# Patient Record
Sex: MALE | Race: WHITE
[De-identification: names, ages, dates, MRNs, and addresses within clinical notes are randomized per-mention and may not be internally consistent; named-entity substitution may affect disease eponyms.]

---

## 2017-06-04 ENCOUNTER — HOSPITAL ENCOUNTER (EMERGENCY)
Dept: HOSPITAL 62 - ER | Age: 42
Discharge: HOME | End: 2017-06-04
Payer: COMMERCIAL

## 2017-06-04 VITALS — SYSTOLIC BLOOD PRESSURE: 122 MMHG | DIASTOLIC BLOOD PRESSURE: 83 MMHG

## 2017-06-04 DIAGNOSIS — R51: ICD-10-CM

## 2017-06-04 DIAGNOSIS — I25.2: ICD-10-CM

## 2017-06-04 DIAGNOSIS — H57.12: ICD-10-CM

## 2017-06-04 DIAGNOSIS — H00.026: Primary | ICD-10-CM

## 2017-06-04 DIAGNOSIS — M54.2: ICD-10-CM

## 2017-06-04 DIAGNOSIS — I10: ICD-10-CM

## 2017-06-04 DIAGNOSIS — M25.512: ICD-10-CM

## 2017-06-04 PROCEDURE — 70450 CT HEAD/BRAIN W/O DYE: CPT

## 2017-06-04 PROCEDURE — 72040 X-RAY EXAM NECK SPINE 2-3 VW: CPT

## 2017-06-04 PROCEDURE — 99284 EMERGENCY DEPT VISIT MOD MDM: CPT

## 2017-06-04 PROCEDURE — 93005 ELECTROCARDIOGRAM TRACING: CPT

## 2017-06-04 PROCEDURE — 93010 ELECTROCARDIOGRAM REPORT: CPT

## 2017-06-04 NOTE — ER DOCUMENT REPORT
ED General





- General


Chief Complaint: Headache


Stated Complaint: LEFT NECK AND SHOULDER PAIN


Time Seen by Provider: 06/04/17 09:25


TRAVEL OUTSIDE OF THE [place] IN LAST 30 DAYS: No





- HPI


Patient complains to provider of: Head pain left eye pain left shoulder pain


Notes: 


Coming in for the above complaints today of starting this morning.  Patient 

states headache and frontal states has a history of lupus states never has had 

a headache like this before.  Localized in the frontal region right above the 

frontal sinus area.  Patient also complains of left eye pain patient does wear 

contacts that she does sleep and contacts.  Patient also complains of left neck 

and shoulder pain.  States sharp tingling intermittent.  Patient does state he 

has had a history of a heart attack in the past with removal of clots noted 

stent placement.  Patient otherwise denies any past medical history.  Denies 

fevers chills nausea vomiting diarrhea.





- Related Data


Allergies/Adverse Reactions: 


 





No Known Allergies Allergy (Verified 06/04/17 07:28)


 











Past Medical History





- Social History


Smoking Status: Current Every Day Smoker


Chew tobacco use (# tins/day): No


Frequency of alcohol use: None


Drug Abuse: None


Family History: Reviewed & Not Pertinent, Other - Father--MI at 60.


Patient has suicidal ideation: No


Patient has homicidal ideation: No





- Past Medical History


Cardiac Medical History: Reports: Hx Heart Attack, Hx Hypertension


Renal/ Medical History: Denies: Hx Peritoneal Dialysis


Past Surgical History: Reports: Hx Cardiac Catheterization





- Immunizations


Hx Diphtheria, Pertussis, Tetanus Vaccination: Yes





Review of Systems





- Review of Systems


Constitutional: Other - Pain eye pain shoulder pain


EENT: No symptoms reported


Cardiovascular: No symptoms reported


Respiratory: No symptoms reported


Gastrointestinal: No symptoms reported


Genitourinary: No symptoms reported


Male Genitourinary: No symptoms reported


Musculoskeletal: No symptoms reported


Skin: No symptoms reported


Hematologic/Lymphatic: No symptoms reported


Neurological/Psychological: No symptoms reported





Physical Exam





- Vital signs


Vitals: 


 











Temp Pulse Resp BP Pulse Ox


 


 97.6 F   82   16   137/80 H  97 


 


 06/04/17 07:28  06/04/17 07:28  06/04/17 07:28  06/04/17 07:28  06/04/17 07:28











Interpretation: Normal





- General


General appearance: Appears well, Alert





- HEENT


Head: Normocephalic, Atraumatic


Eyes: Normal


Pupils: PERRL


Neck: Other - Fluids reproducible paraspinal neck pain


Notes: 


Evaluation of the patient's eye after tetracaine instillation and floor stating 

shows that patient has been forcing uptake.  There is no signs of corneal 

abrasion or ulcer formation.  Patient does have a stye formation of the bottom 

and upper lip.  There is mild swelling of the upper and bottom lip.  No signs 

of orbital cellulitis.  Patient does have ocular motions intact.





- Respiratory


Respiratory status: No respiratory distress


Chest status: Nontender


Breath sounds: Normal


Chest palpation: Normal





- Cardiovascular


Rhythm: Regular


Heart sounds: Normal auscultation


Murmur: No





- Abdominal


Inspection: Normal


Distension: No distension


Bowel sounds: Normal


Tenderness: Nontender


Organomegaly: No organomegaly





- Back


Back: Normal, Nontender





- Extremities


General upper extremity: Normal inspection, Nontender, Normal color, Normal ROM

, Normal temperature


General lower extremity: Normal inspection, Nontender, Normal color, Normal ROM

, Normal temperature, Normal weight bearing.  No: Ankush's sign





- Neurological


Neuro grossly intact: Yes


Cognition: Normal


Orientation: AAOx4


Tremaine Coma Scale Eye Opening: Spontaneous


Tremaine Coma Scale Verbal: Oriented


Ratcliff Coma Scale Motor: Obeys Commands


[place]iff Coma Scale Total: 15


Speech: Normal


Motor strength normal: LUE, RUE, LLE, RLE


Sensory: Normal





- Psychological


Associated symptoms: Normal affect, Normal mood





- Skin


Skin Temperature: Warm


Skin Moisture: Dry


Skin Color: Normal





Course





- Re-evaluation


Re-evalutation: 


06/04/17 15:22


EKG x-ray of the neck and head CT were performed showing diffuse arthritic 

changes in the neck had been EKG negative.  Eye examination is as stated.  

Patient will begin Polytrim drops for the stye formation.  Patient was educated 

about treatment of the stye.  Patient was given for Fioricet for his headache.





06/04/17 15:24








- Vital Signs


Vital signs: 


 











Temp Pulse Resp BP Pulse Ox


 


 97.5 F   63   16   122/83   97 


 


 06/04/17 12:18  06/04/17 12:18  06/04/17 12:18  06/04/17 12:18  06/04/17 12:18














Procedures





- Eye Procedure


  ** Left


Notes: 





06/04/17 15:24


Evaluation of the patient's eye after tetracaine instillation and floor stating 

shows that patient has been forcing uptake.  There is no signs of corneal 

abrasion or ulcer formation.  Patient does have a stye formation of the bottom 

and upper lip.  There is mild swelling of the upper and bottom lip.  No signs 

of orbital cellulitis.  Patient does have ocular motions intact.





Discharge





- Discharge


Clinical Impression: 


Hordeolum of left eye


Qualifiers:


 Hordeolum type: internum Eyelid: unspecified eyelid Qualified Code(s): H00.026 

- Hordeolum internum left eye, unspecified eyelid





Headache


Qualifiers:


 Headache type: unspecified Headache chronicity pattern: unspecified pattern 

Intractability: not intractable Qualified Code(s): R51 - Headache





Condition: Good


Disposition: HOME, SELF-CARE


Instructions:  Headache ([facility]), Sty ([facility]), Oral Narcotic Medication ([facility])


Additional Instructions: 


The eyedrops given to you here in the ER 2 drops 4 times a day for 7 days.





Medication as prescribed.  He may also take Tylenol and Motrin if not taking 

the headache medicine.


Prescriptions: 


Butalb/Acetaminophen/Caffeine [Fioricet -40 mg Capsule] 1 cap PO Q4 PRN #

20 cap


 PRN Reason: 


Forms:  Return to Work


Referrals: 


SANDEEP MCDUFFIE MD [Primary Care Provider] - Follow up as needed

## 2017-06-04 NOTE — EKG REPORT
SEVERITY:- ABNORMAL ECG -

SINUS RHYTHM

NONSPECIFIC INTRAVENTRICULAR CONDUCTION DELAY

:

Confirmed by: Felicia Kebede 04-Jun-2017 13:26:38

## 2017-06-04 NOTE — RADIOLOGY REPORT (SQ)
EXAM DESCRIPTION:  CERV SP 3 VIEW OR LESS



COMPLETED DATE/TIME:  6/4/2017 9:51 am



REASON FOR STUDY:  headache neck pain



COMPARISON:  None.



NUMBER OF VIEWS:  Three views.



TECHNIQUE:  AP, lateral and odontoid radiographic images acquired of the cervical spine.



LIMITATIONS:  None.



FINDINGS:  MINERALIZATION: Normal.

ALIGNMENT: Anatomic.

VERTEBRAE: Vertebral bodies of normal height.

DISCS: Mild disc space loss of height with anterior osteophyte formation at C6-7

HARDWARE: None in the spine.

SOFT TISSUES: No masses or calcifications. Lung apices clear.

OTHER: No other significant finding.



IMPRESSION:  Mild degenerative disc changes at C6-7



TECHNICAL DOCUMENTATION:  JOB ID:  [id number]

 2011 [employer]- All Rights Reserved

## 2017-12-11 ENCOUNTER — HOSPITAL ENCOUNTER (EMERGENCY)
Dept: HOSPITAL 62 - ER | Age: 42
Discharge: HOME | End: 2017-12-11
Payer: SELF-PAY

## 2017-12-11 VITALS — DIASTOLIC BLOOD PRESSURE: 82 MMHG | SYSTOLIC BLOOD PRESSURE: 121 MMHG

## 2017-12-11 DIAGNOSIS — I10: ICD-10-CM

## 2017-12-11 DIAGNOSIS — R11.0: ICD-10-CM

## 2017-12-11 DIAGNOSIS — R51: Primary | ICD-10-CM

## 2017-12-11 DIAGNOSIS — F17.200: ICD-10-CM

## 2017-12-11 DIAGNOSIS — I25.2: ICD-10-CM

## 2017-12-11 LAB
ANION GAP SERPL CALC-SCNC: 14 MMOL/L (ref 5–19)
BASOPHILS # BLD AUTO: 0.1 10^3/UL (ref 0–0.2)
BASOPHILS NFR BLD AUTO: 0.5 % (ref 0–2)
BUN SERPL-MCNC: 14 MG/DL (ref 7–20)
CALCIUM: 9.5 MG/DL (ref 8.4–10.2)
CHLORIDE SERPL-SCNC: 102 MMOL/L (ref 98–107)
CO2 SERPL-SCNC: 25 MMOL/L (ref 22–30)
CREAT SERPL-MCNC: 0.84 MG/DL (ref 0.52–1.25)
EOSINOPHIL # BLD AUTO: 0.1 10^3/UL (ref 0–0.6)
EOSINOPHIL NFR BLD AUTO: 0.9 % (ref 0–6)
ERYTHROCYTE [DISTWIDTH] IN BLOOD BY AUTOMATED COUNT: 13.1 % (ref 11.5–14)
GLUCOSE SERPL-MCNC: 112 MG/DL (ref 75–110)
HCT VFR BLD CALC: 42.8 % (ref 37.9–51)
HGB BLD-MCNC: 14.9 G/DL (ref 13.5–17)
HGB HCT DIFFERENCE: 1.9
LYMPHOCYTES # BLD AUTO: 2 10^3/UL (ref 0.5–4.7)
LYMPHOCYTES NFR BLD AUTO: 13.7 % (ref 13–45)
MCH RBC QN AUTO: 30.2 PG (ref 27–33.4)
MCHC RBC AUTO-ENTMCNC: 34.8 G/DL (ref 32–36)
MCV RBC AUTO: 87 FL (ref 80–97)
MONOCYTES # BLD AUTO: 0.6 10^3/UL (ref 0.1–1.4)
MONOCYTES NFR BLD AUTO: 4 % (ref 3–13)
NEUTROPHILS # BLD AUTO: 12 10^3/UL (ref 1.7–8.2)
NEUTS SEG NFR BLD AUTO: 80.9 % (ref 42–78)
POTASSIUM SERPL-SCNC: 4.5 MMOL/L (ref 3.6–5)
RBC # BLD AUTO: 4.93 10^6/UL (ref 4.35–5.55)
SODIUM SERPL-SCNC: 140.5 MMOL/L (ref 137–145)
WBC # BLD AUTO: 14.9 10^3/UL (ref 4–10.5)

## 2017-12-11 PROCEDURE — 96374 THER/PROPH/DIAG INJ IV PUSH: CPT

## 2017-12-11 PROCEDURE — 80048 BASIC METABOLIC PNL TOTAL CA: CPT

## 2017-12-11 PROCEDURE — 96375 TX/PRO/DX INJ NEW DRUG ADDON: CPT

## 2017-12-11 PROCEDURE — 36415 COLL VENOUS BLD VENIPUNCTURE: CPT

## 2017-12-11 PROCEDURE — 85025 COMPLETE CBC W/AUTO DIFF WBC: CPT

## 2017-12-11 PROCEDURE — S0119 ONDANSETRON 4 MG: HCPCS

## 2017-12-11 PROCEDURE — 70450 CT HEAD/BRAIN W/O DYE: CPT

## 2017-12-11 PROCEDURE — 99284 EMERGENCY DEPT VISIT MOD MDM: CPT

## 2017-12-11 NOTE — ER DOCUMENT REPORT
ED General





- General


Chief Complaint: Headache


Stated Complaint: HEADACHE


Time Seen by Provider: 12/11/17 19:05


Mode of Arrival: Ambulatory


Notes: 





Patient is a 42-year-old male with a past medical history of recurrent 

headaches who presents with a headache that started approximately 7 PM this 

evening and has gotten gradually worse since that time.  Patient does describe 

it as a constant, dull, throbbing global headache that is worsened by lights, 

sounds, and talking.  Patient states his current symptoms are similar to the 

headaches that he normally gets although his current headache is worse and 

unresponsive to the medicine that he usually uses: Metaxolone. Patient states 

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 

choroid plexus papilloma) and that he has protruding disks in his neck.  

Patient states he feels extremely nauseated but denies any focal weakness, 

numbness, fever or altered mental status.  He has not seen his primary care 

doctor regarding today's concerns.


TRAVEL OUTSIDE OF THE [place] IN LAST 30 DAYS: No





- Related Data


Allergies/Adverse Reactions: 


 





No Known Allergies Allergy (Verified 12/11/17 18:33)


 








Home Medications: 


 Current Home Medications





Celecoxib [Celecoxib] 1 cap PO DAILY 12/11/17 [History]


Metaxalone [Metaxalone] 1 tab PO TID PRN 12/11/17 [History]


Nicotine [Nicoderm 7 mg/24 Hr Transdermal Patch] 1 patch TOP DAILY 12/11/17 [

History]











Past Medical History





- General


Information source: Patient





- Social History


Smoking Status: Current Every Day Smoker


Frequency of alcohol use: Rare


Drug Abuse: None


Lives with: Spouse/Significant other


Family History: Reviewed & Not Pertinent, Other - Father--MI at 60.


Patient has suicidal ideation: No


Patient has homicidal ideation: No





- Past Medical History


Cardiac Medical History: Reports: Hx Heart Attack, Hx Hypertension


Renal/ Medical History: Denies: Hx Peritoneal Dialysis


Past Surgical History: Reports: Hx Cardiac Catheterization





- Immunizations


Hx Diphtheria, Pertussis, Tetanus Vaccination: Yes





Review of Systems





- Review of Systems


Notes: 





Constitutional: Negative for fever.


HENT: Negative for sore throat.


Eyes: Negative for visual changes.


Cardiovascular: Negative for chest pain.


Respiratory: Negative for shortness of breath.


Gastrointestinal: Positive for nausea


Genitourinary: Negative for dysuria.


Musculoskeletal: Negative for back pain.


Skin: Negative for rash.


Neurological: Positive for headache





10 point ROS negative except as marked above and in HPI.





Physical Exam





- Vital signs


Vitals: 


 











Temp Pulse Resp BP Pulse Ox


 


 97.8 F   82   18   143/88 H  98 


 


 12/11/17 18:41  12/11/17 18:41  12/11/17 18:41  12/11/17 18:41  12/11/17 18:41











Interpretation: Normal


Notes: 





PHYSICAL EXAMINATION:





GENERAL: Well-appearing, well-nourished and in no acute distress.





HEAD: Atraumatic, normocephalic.





EYES: Pupils equal round and reactive to light, extraocular movements intact, 

sclera anicteric, conjunctiva are normal.





ENT: nares patent, oropharynx clear without exudates.  Moist mucous membranes.





NECK: Normal range of motion, supple without lymphadenopathy





LUNGS: Breath sounds clear to auscultation bilaterally and equal.  No wheezes 

rales or rhonchi.





HEART: Regular rate and rhythm without murmurs





ABDOMEN: Soft, nontender, normoactive bowel sounds.  No guarding, no rebound.  

No masses appreciated.





EXTREMITIES: Normal range of motion, no pitting or edema.  No cyanosis.





NEUROLOGICAL: Face symmetric.  Tongue protrudes midline.  Extraocular motions 

intact.  Pupils are 2 mm and equally reactive.  Normal speech, normal gait.  5 

out of 5 strength in both the distal and proximal upper and lower extremities 

bilaterally.  Sensation is grossly intact throughout.  Finger to nose testing 

normal.  Pronator drift normal.





PSYCH: Normal mood, normal affect.





SKIN: Warm, Dry, normal turgor, no rashes or lesions noted.





Course





- Re-evaluation


Re-evalutation: 





12/11/17 21:52


Presentation of a headache that appears to be most consistent with tension 

versus migrainous type headache.  Headache was not maximal in onset, patient 

has no focal neurologic deficits, no nuchal rigidity, vital signs within normal 

limits, no papilledema, and patient is overall well in appearance.  Based on 

clinical history and examination I do not suspect an acute subarachnoid 

hemorrhage, dural venous sinus thrombosis, acute meningitis, or intercranial 

mass.  Patient does have a history of possible hydrocephalus secondary to a 

mass in his cervical spine although CT the head today does not show any 

evidence of hydrocephalus or any other acute abnormality.  Labs obtained in 

triage otherwise unremarkable.  Patient has no additional complaints regarding 

neck pain or inability to mobilize the neck.  Proceed with a headache cocktail 

and reassess


12/11/17 23:21


Patient is a complete resolution of his headache.  He remains without any 

neurologic deficits.  At this time will discharge with return precautions and 

follow-up recommendations.  Verbal discharge instructions given a the bedside 

and opportunity for questions given. Medication warnings reviewed. Patient is 

in agreement with this plan and has verbalized understanding of return 

precautions and the need for primary care follow-up in the next 24-72 hours.





- Vital Signs


Vital signs: 


 











Temp Pulse Resp BP Pulse Ox


 


 97.8 F   70   16   121/82   96 


 


 12/11/17 23:34  12/11/17 23:34  12/11/17 23:34  12/11/17 23:34  12/11/17 23:34














- Laboratory


Result Diagrams: 


 12/11/17 19:26





 12/11/17 19:26


Laboratory results interpreted by me: 


 











  12/11/17 12/11/17





  19:26 19:26


 


WBC  14.9 H 


 


Seg Neutrophils %  80.9 H 


 


Absolute Neutrophils  12.0 H 


 


Glucose   112 H














- Diagnostic Test


Radiology reviewed: Image reviewed, Reports reviewed


Radiology results interpreted by me: 





12/11/17 23:22


CT head: No acute intracranial bleed





Discharge





- Discharge


Clinical Impression: 


 Nausea





Headache


Qualifiers:


 Headache type: unspecified Headache chronicity pattern: acute headache 

Intractability: not intractable Qualified Code(s): R51 - Headache





Condition: Good


Disposition: HOME, SELF-CARE


Additional Instructions: 


You have been seen in the Emergency Department (ED) for a headache.  Please use 

Tylenol (acetaminophen) or Motrin (ibuprofen) as needed for symptoms, but only 

as written on the box. 





As we have discussed, please follow up with your primary care doctor as soon as 

possible regarding today's ED visit and your headache symptoms.  





Call your doctor or return to the ED if you have a worsening headache, sudden 

and severe headache, confusion, slurred speech, facial droop, weakness or 

numbness in any arm or leg, extreme fatigue, or other symptoms that concern you.


Referrals: 


JOAN MCDUFFIE MD [Primary Care Provider] - Follow up as needed

## 2017-12-11 NOTE — ER DOCUMENT REPORT
ED Medical Screen (RME)





- General


Mode of Arrival: Ambulatory


Information source: Patient


TRAVEL OUTSIDE OF THE [place] IN LAST 30 DAYS: No





- General


Chief Complaint: Headache


Stated Complaint: HEADACHE


Time Seen by Provider: 12/11/17 19:05


Notes: 





Of note, patient shows me later a MRI of his cervical spine which showed a mass 

thought as above which potentially could be a reason to have obstructing 

hydrocephalus.  Though he does not have classic clinical symptoms but this is a 

change in his headache, a CT of the head was ordered to evaluate for this. (

ABERCROMBIE,JOHN F)


Patient is a 42 year old male with a history of headaches presents to the 

emergency department complaining of a headache onset last night around 1900. 

Patient states his current symptoms are similar to the headaches that he 

normally gets although his current headache is worse and unresponsive to his 

medication of Metaxolone. Patient states that a 1x7.5 cm mass was found in his 

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 

protruding disks in his neck. Patient also states that his legs feel weak and 

he has nausea. Patient denies vomiting, diarrhea, or blurry vision. 





GENERAL: Alert, interacts well. No acute distress.


HEAD: Normocephalic, atraumatic.


EYES: Pupils equal, round, and reactive to light. Extraocular movements intact.


NECK: Full range of motion. Supple. Trachea midline.


LUNGS: Clear to auscultation bilaterally, no wheezes, rales, or rhonchi. No 

respiratory distress.


HEART: Regular rate and rhythm. No murmurs, gallops, or rubs..


NEUROLOGICAL: Alert and oriented x3. Normal speech. cranial nerves II through 

XII grossly intact. 


PSYCH: Normal affect, normal mood.


SKIN: Warm, dry, normal turgor. No rashes or lesions noted





I have greeted and performed a rapid initial assessment of this patient. A 

comprehensive ED assessment and evaluation of the patient, analysis of test 

results and completion of the medical decision making process will be conducted 

by additional ED providers.








 (MAYDA VERONICA)





- Related Data


Allergies/Adverse Reactions: 


 





No Known Allergies Allergy (Verified 12/11/17 18:33)


 








Home Medications: 


 Current Home Medications





Celecoxib [Celecoxib] 1 cap PO DAILY 12/11/17 [History]


Metaxalone [Metaxalone] 1 tab PO TID PRN 12/11/17 [History]


Nicotine [Nicoderm 7 mg/24 Hr Transdermal Patch] 1 patch TOP DAILY 12/11/17 [

History]











Past Medical History





- General


Information source: Patient





- Social History


Frequency of alcohol use: Rare


Drug Abuse: None





- Past Medical History


Cardiac Medical History: Reports: Hx Heart Attack, Hx Hypertension


Past Surgical History: Reports: Hx Cardiac Catheterization





- Immunizations


Hx Diphtheria, Pertussis, Tetanus Vaccination: Yes





- Vital signs


Vitals: 


 











Temp Pulse Resp BP Pulse Ox


 


 97.8 F   82   18   143/88 H  98 


 


 12/11/17 18:41  12/11/17 18:41  12/11/17 18:41  12/11/17 18:41  12/11/17 18:41














- Vital Signs


Vital signs: 


 











Temp Pulse Resp BP Pulse Ox


 


 97.8 F   82   18   143/88 H  98 


 


 12/11/17 18:41  12/11/17 18:41  12/11/17 18:41  12/11/17 18:41  12/11/17 18:41














Scribe Documentation





- Scribe


Written by Laurie:: Laurie Lou, 12/11/2017 19:28


acting as scribe for :: Abercrombie

## 2017-12-11 NOTE — RADIOLOGY REPORT (SQ)
EXAM DESCRIPTION:  CT HEAD WITHOUT



COMPLETED DATE/TIME:  12/11/2017 7:35 pm



REASON FOR STUDY:  Headache Hx of 4th ventricle mass, eval for hydroc



COMPARISON:  6/4/2017



TECHNIQUE:  Axial images acquired through the brain without intravenous contrast.  Images reviewed wi
th bone, brain and subdural windows.  Images stored on PACS.

All CT scanners at this facility use dose modulation, iterative reconstruction, and/or weight based d
osing when appropriate to reduce radiation dose to as low as reasonably achievable (ALARA).

[facility]: Dose Right  [facility]: CareDose    [facility]: Dose Right    [facility]: Teradose 4D    [facility]: Smart TripletPlus



RADIATION DOSE:  CT Rad equipment meets quality standard of care and radiation dose reduction techniq
ues were employed. CTDIvol: 64.6 mGy. DLP: 1034 mGy-cm. mGy.



LIMITATIONS:  None.



FINDINGS:  VENTRICLES: Normal size and contour.

CEREBRUM: No masses.  No hemorrhage.  No midline shift.  No evidence for acute infarction. Normal gra
y/white matter differentiation. No areas of low density in the white matter.

CEREBELLUM: No masses.  No hemorrhage.  No alteration of density.  No evidence for acute infarction.

EXTRAAXIAL SPACES: No fluid collections.  No masses.

ORBITS AND GLOBE: No intra- or extraconal masses.  Normal contour of globe without masses.

CALVARIUM: No fracture.

PARANASAL SINUSES: Mild mucosal thickening bilateral ethmoid sinuses and moderate mucosal thickening 
left maxillary sinus.

SOFT TISSUES: No mass or hematoma.

OTHER: No other significant finding.



IMPRESSION:  PARANASAL SINUS DISEASE.  OTHERWISE UNREMARKABLE NONCONTRAST CT HEAD.

EVIDENCE OF ACUTE STROKE: NO.



COMMENT:  Quality ID # 436: Final reports with documentation of one or more dose reduction techniques
 (e.g., Automated exposure control, adjustment of the mA and/or kV according to patient size, use of 
iterative reconstruction technique)



TECHNICAL DOCUMENTATION:  JOB ID:  [id number]

 2011 [employer]- All Rights Reserved

## 2018-02-13 ENCOUNTER — HOSPITAL ENCOUNTER (OUTPATIENT)
Dept: HOSPITAL 62 - OD | Age: 43
End: 2018-02-13
Attending: PHYSICIAN ASSISTANT
Payer: COMMERCIAL

## 2018-02-13 DIAGNOSIS — R20.2: Primary | ICD-10-CM

## 2018-02-13 PROCEDURE — 72110 X-RAY EXAM L-2 SPINE 4/>VWS: CPT

## 2018-02-13 NOTE — RADIOLOGY REPORT (SQ)
EXAM DESCRIPTION:  LUMBAR SPINE COMPLETE



COMPLETED DATE/TIME:  2/13/2018 10:01 am



REASON FOR STUDY:  PARESTHESIA OF SKIN R20.2  PARESTHESIA OF SKIN



COMPARISON:  None.



NUMBER OF VIEWS:  Five views including obliques.



TECHNIQUE:  AP, lateral, oblique, and sacral radiographic images acquired of the lumbar spine.



LIMITATIONS:  None.



FINDINGS:  MINERALIZATION: Normal.

SEGMENTATION: Normal.  No transitional anatomy.

ALIGNMENT: Normal.

VERTEBRAE: Maintained height.  No fracture or worrisome bone lesion.

DISCS: Preserved height.  No significant osteophytes or end plate irregularity.

POSTERIOR ELEMENTS: Pedicles and facets are intact.  No pars defect or posterior arch defects.

HARDWARE: None in the spine.

PARASPINAL SOFT TISSUES: Normal.

PELVIS: Intact as visualized. No fractures or worrisome bone lesions. SI joints intact.

OTHER: No other significant finding.



IMPRESSION:  NORMAL 5 VIEW LUMBAR SPINE.



TECHNICAL DOCUMENTATION:  JOB ID:  [id number]

 2011 [email or web address]- All Rights Reserved

## 2018-10-05 ENCOUNTER — HOSPITAL ENCOUNTER (OUTPATIENT)
Dept: HOSPITAL 62 - OD | Age: 43
End: 2018-10-05
Attending: PHYSICIAN ASSISTANT
Payer: COMMERCIAL

## 2018-10-05 DIAGNOSIS — M79.671: Primary | ICD-10-CM

## 2018-10-05 NOTE — RADIOLOGY REPORT (SQ)
EXAM DESCRIPTION:  ANKLE RIGHT COMPLETE



COMPLETED DATE/TIME:  10/5/2018 10:52 am



REASON FOR STUDY:  RT FOOT PAIN M79.671  PAIN IN RIGHT FOOT



COMPARISON:  None.



NUMBER OF VIEWS:  Three views.



TECHNIQUE:  AP, lateral, and oblique radiographic images acquired of the right ankle.



LIMITATIONS:  None.



FINDINGS:  MINERALIZATION: Normal.

BONES: No acute fracture or dislocation.  No worrisome bone lesions.

JOINTS: No effusions.

SOFT TISSUES: No soft tissue swelling. No foreign body.

OTHER: Plantar calcaneal spurs.



IMPRESSION:  NO RADIOGRAPHIC EVIDENCE OF ACUTE INJURY.



TECHNICAL DOCUMENTATION:  JOB ID:  [id number]

 2011 [employer]- All Rights Reserved



Reading location - IP/workstation name: [email or web address]

## 2022-11-28 ENCOUNTER — OFFICE VISIT (OUTPATIENT)
Dept: NEUROLOGY | Facility: CLINIC | Age: 47
End: 2022-11-28

## 2022-11-28 VITALS
OXYGEN SATURATION: 97 % | HEART RATE: 90 BPM | BODY MASS INDEX: 37.03 KG/M2 | DIASTOLIC BLOOD PRESSURE: 96 MMHG | HEIGHT: 69 IN | SYSTOLIC BLOOD PRESSURE: 140 MMHG | WEIGHT: 250 LBS

## 2022-11-28 DIAGNOSIS — D49.6 INTRACRANIAL TUMOR: Primary | ICD-10-CM

## 2022-11-28 PROCEDURE — 99204 OFFICE O/P NEW MOD 45 MIN: CPT | Performed by: PSYCHIATRY & NEUROLOGY

## 2022-11-28 RX ORDER — ERGOCALCIFEROL 1.25 MG/1
50000 CAPSULE ORAL WEEKLY
COMMUNITY
Start: 2022-11-21

## 2022-11-28 RX ORDER — AMOXICILLIN AND CLAVULANATE POTASSIUM 875; 125 MG/1; MG/1
1 TABLET, FILM COATED ORAL 2 TIMES DAILY
COMMUNITY
Start: 2022-11-23

## 2022-11-28 RX ORDER — ATORVASTATIN CALCIUM 20 MG/1
TABLET, FILM COATED ORAL
COMMUNITY
Start: 2022-11-21

## 2022-11-28 NOTE — PROGRESS NOTES
Notes by MA:  Patient referred to our office for brain tumor. Pt sts tumor was found in 2017.      Subjective:     Dear Liseth, thank you for referring Mr. Cisneros for neurological consultation.  As you know, he is a 47-year-old right-handed gentleman who had an MRI scan of the cervical spine performed because of neck pain in 2017.  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  He was followed by neurology and neurosurgery and head scans at least every 2 years of his brain.  This proved, per his report, 2 be stable over the ensuing 3 years but he has not had any imaging in the last 2 years.  Aside from some back and neck pain and weight gain he does not have any new complaints.  Patient ID: Balaji Cisneros is a 47 y.o. male.    History of Present Illness  The following portions of the patient's history were reviewed and updated as appropriate: allergies, current medications, past family history, past medical history, past social history, past surgical history and problem list.    Review of Systems   Constitutional: Positive for appetite change (decreased appetite). Negative for activity change and fatigue.   HENT: Negative for facial swelling, trouble swallowing and voice change.    Eyes: Negative for photophobia, pain and visual disturbance.   Respiratory: Negative for chest tightness, shortness of breath and wheezing.    Cardiovascular: Negative for chest pain, palpitations and leg swelling.   Gastrointestinal: Negative for abdominal pain, nausea and vomiting.   Endocrine: Negative for cold intolerance and heat intolerance.   Musculoskeletal: Positive for arthralgias (knees) and gait problem. Negative for back pain, joint swelling, myalgias, neck pain and neck stiffness.   Neurological: Positive for headaches. Negative for dizziness, tremors, seizures, syncope,  facial asymmetry, speech difficulty, weakness, light-headedness and numbness.   Hematological: Does not bruise/bleed easily.   Psychiatric/Behavioral: Negative for agitation, behavioral problems, confusion, decreased concentration, dysphoric mood, hallucinations, self-injury, sleep disturbance and suicidal ideas. The patient is not nervous/anxious and is not hyperactive.         Objective:    Neurologic Exam     Mental Status   Oriented to person, place, and time.   Speech: speech is normal   Level of consciousness: alert  Knowledge: good.     Cranial Nerves   Cranial nerves II through XII intact.     Motor Exam   Muscle bulk: normal  Overall muscle tone: normal    Strength   Right neck flexion: 5/5  Left neck flexion: 5/5  Right neck extension: 5/5  Left neck extension: 5/5  Right deltoid: 5/5  Left deltoid: 5/5  Right biceps: 5/5  Left biceps: 5/5  Right triceps: 5/5  Left triceps: 5/5  Right wrist flexion: 5/5  Left wrist flexion: 5/5  Right wrist extension: 5/5  Left wrist extension: 5/5  Right interossei: 5/5  Left interossei: 5/5  Right abdominals: 5/5  Left abdominals: 5/5  Right iliopsoas: 5/5  Left iliopsoas: 5/5  Right quadriceps: 5/5  Left quadriceps: 5/5  Right hamstrin/5  Left hamstrin/5  Right glutei: 5/5  Left glutei: 5/5  Right anterior tibial: 5/5  Left anterior tibial: 5/5  Right posterior tibial: 5/5  Left posterior tibial: 5/5  Right peroneal: 5/5  Left peroneal: 5/5  Right gastroc: 5/5  Left gastroc: 5/5    Sensory Exam   Light touch normal.   Vibration normal.     Gait, Coordination, and Reflexes     Gait  Gait: normal    Coordination   Romberg: negative    Tremor   Resting tremor: absent  Intention tremor: absent    Reflexes   Right brachioradialis: 2+  Left brachioradialis: 2+  Right biceps: 2+  Left biceps: 2+  Right triceps: 2+  Left triceps: 2+  Right patellar: 2+  Left patellar: 2+  Right achilles: 2+  Left achilles: 2+  Right : 2+  Left : 2+      Physical Exam  Neck:       Vascular: No carotid bruit.   Cardiovascular:      Rate and Rhythm: Regular rhythm.   Musculoskeletal:      Cervical back: Neck supple.      Right lower leg: No edema.      Left lower leg: No edema.   Neurological:      Mental Status: He is oriented to person, place, and time.      Cranial Nerves: Cranial nerves 2-12 are intact.      Coordination: Romberg Test normal.      Gait: Gait is intact.      Deep Tendon Reflexes:      Reflex Scores:       Tricep reflexes are 2+ on the right side and 2+ on the left side.       Bicep reflexes are 2+ on the right side and 2+ on the left side.       Brachioradialis reflexes are 2+ on the right side and 2+ on the left side.       Patellar reflexes are 2+ on the right side and 2+ on the left side.       Achilles reflexes are 2+ on the right side and 2+ on the left side.  Psychiatric:         Speech: Speech normal.         Assessment/Plan:     Diagnoses and all orders for this visit:    1. 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  He has been followed every 1 to 2 years since that time but its been 2 years since his most recent surveillance scan.  It is time to arrange for that and we will arrange for a pre and post contrasted MRI of the brain, and follow this every 2 years, earlier if necessary.  We will see him back in clinic in 1 year to see how he is doing.  We discussed this mass and what symptoms to look for, should it change and I answered his questions today.

## 2022-12-09 ENCOUNTER — PATIENT ROUNDING (BHMG ONLY) (OUTPATIENT)
Dept: NEUROLOGY | Facility: CLINIC | Age: 47
End: 2022-12-09

## 2022-12-22 ENCOUNTER — APPOINTMENT (OUTPATIENT)
Dept: MRI IMAGING | Facility: HOSPITAL | Age: 47
End: 2022-12-22

## 2023-01-30 ENCOUNTER — HOSPITAL ENCOUNTER (OUTPATIENT)
Dept: MRI IMAGING | Facility: HOSPITAL | Age: 48
Discharge: HOME | End: 2023-01-30
Admitting: PSYCHIATRY & NEUROLOGY
Payer: COMMERCIAL

## 2023-01-30 DIAGNOSIS — D49.6 INTRACRANIAL TUMOR: ICD-10-CM

## 2023-01-30 PROCEDURE — A9577 INJ MULTIHANCE: HCPCS | Performed by: PSYCHIATRY & NEUROLOGY

## 2023-01-30 PROCEDURE — 0 GADOBENATE DIMEGLUMINE 529 MG/ML SOLUTION: Performed by: PSYCHIATRY & NEUROLOGY

## 2023-01-30 PROCEDURE — 70553 MRI BRAIN STEM W/O & W/DYE: CPT

## 2023-01-30 RX ADMIN — GADOBENATE DIMEGLUMINE 20 ML: 529 INJECTION, SOLUTION INTRAVENOUS at 15:45

## 2023-01-31 ENCOUNTER — TELEPHONE (OUTPATIENT)
Dept: NEUROLOGY | Facility: CLINIC | Age: 48
End: 2023-01-31
Payer: COMMERCIAL

## 2023-01-31 NOTE — TELEPHONE ENCOUNTER
----- Message from Jose Hi MD sent at 1/31/2023  3:37 PM EST -----  Brain MRI looks stable.  The small mass has not changed.  Follow-up as scheduled.

## 2023-05-23 NOTE — RADIOLOGY REPORT (SQ)
EXAM DESCRIPTION:  CT HEAD WITHOUT



COMPLETED DATE/TIME:  6/4/2017 9:52 am



REASON FOR STUDY:  headache



COMPARISON:  None.



TECHNIQUE:  Axial images acquired through the brain without intravenous contrast.  Images reviewed wi
th bone, brain and subdural windows.  Images stored on PACS.

All CT scanners at this facility use dose modulation, iterative reconstruction, and/or weight based d
osing when appropriate to reduce radiation dose to as low as reasonably achievable (ALARA).

[facility]: Dose Right  [facility]: CareDose    [facility]: Dose Right    [facility]: Teradose 4D    [facility]: Smart Technologies



RADIATION DOSE:  64.61 mGy.



LIMITATIONS:  None.



FINDINGS:  VENTRICLES: Normal size and contour.

CEREBRUM: No masses.  No hemorrhage.  No midline shift.  Normal gray/white matter differentiation.  N
o evidence for acute infarction.

CEREBELLUM: No masses.  No hemorrhage.  No alteration of density.  No evidence for acute infarction.

EXTRAAXIAL SPACES: No fluid collections.  No masses.

ORBITS AND GLOBE: No intra- or extraconal masses.  Normal contour of globe without masses.

CALVARIUM: No fracture.

PARANASAL SINUSES: No fluid or mucosal thickening.

SOFT TISSUES: No mass or hematoma.

OTHER: No other significant finding.



IMPRESSION:  NORMAL BRAIN CT WITHOUT CONTRAST.



TECHNICAL DOCUMENTATION:  JOB ID:  [id number]

Quality ID # 436: Final reports with documentation of one or more dose reduction techniques (e.g., Au
tomated exposure control, adjustment of the mA and/or kV according to patient size, use of iterative 
reconstruction technique)

 2011 [employer]- All Rights Reserved Anxiety:  medication, watching TV, laying down, deep breathing (2 sniffs, blow out)

## 2025-02-05 ENCOUNTER — OFFICE VISIT (OUTPATIENT)
Dept: SURGERY | Facility: CLINIC | Age: 50
End: 2025-02-05
Payer: COMMERCIAL

## 2025-02-05 VITALS
DIASTOLIC BLOOD PRESSURE: 80 MMHG | RESPIRATION RATE: 16 BRPM | HEIGHT: 69 IN | HEART RATE: 72 BPM | WEIGHT: 260 LBS | SYSTOLIC BLOOD PRESSURE: 132 MMHG | BODY MASS INDEX: 38.51 KG/M2

## 2025-02-05 DIAGNOSIS — K82.8 GALLBLADDER SLUDGE: Primary | ICD-10-CM

## 2025-02-05 PROCEDURE — 99204 OFFICE O/P NEW MOD 45 MIN: CPT | Performed by: SURGERY

## 2025-02-05 RX ORDER — ASPIRIN 81 MG/1
81 TABLET, CHEWABLE ORAL DAILY
COMMUNITY

## 2025-02-05 NOTE — LETTER
2025     MADELINE Ovalles  [facility]  Counts include 234 beds at the [facility] [address]    Patient: Balaji Cisneros   YOB: 1975   Date of Visit: 2025     Dear MADELINE Ovalles:       Thank you for referring Balaji Cisneros to me for evaluation. Below are the relevant portions of my assessment and plan of care.    If you have questions, please do not hesitate to call me. I look forward to following Balaji along with you.         Sincerely,        Magda Montelongo DO        CC: No Recipients    Magda Montelongo DO  [phone]  Sign when Signing Visit  Balaji Cisneros 49 y.o. male presents @ the req of MADELINE Virk for eval of GB SLUDGE.  Chief Complaint   Patient presents with   • GB SLUDGE             HPI   Above noted agree.  This very pleasant 49-year-old male has been having issues with nausea and heartburn and bloating, as well as right upper quadrant pain.  He had an ultrasound that showed gallbladder sludge.      Review of Systems   All other systems reviewed and are negative.            Current Outpatient Medications:   •  aspirin 81 MG chewable tablet, Chew 1 tablet Daily., Disp: , Rfl:   •  atorvastatin (LIPITOR) 20 MG tablet, TAKE 1 TABLET BY MOUTH AT BEDTIME AS DIRECTED, Disp: , Rfl:         No Known Allergies        Past Medical History:   Diagnosis Date   • Brain tumor (benign)    • Headache    • Hx of blood clots    • Myocardial infarction            Past Surgical History:   Procedure Laterality Date   • CARDIAC CATHETERIZATION             Social History     Tobacco Use   • Smoking status: Former     Current packs/day: 0.00     Types: Cigarettes     Quit date: 2011     Years since quittin.0   • Smokeless tobacco: Never   Vaping Use   • Vaping status: Never Used   Substance Use Topics   • Alcohol use: Yes     Comment: rarely           Immunization History   Administered Date(s) Administered   • COVID-19 (PFIZER) Purple Cap Monovalent 2021, 2021  "          Physical Exam  Vitals and nursing note reviewed.   Constitutional:       Appearance: Normal appearance.   HENT:      Head: Normocephalic and atraumatic.   Cardiovascular:      Rate and Rhythm: Normal rate and regular rhythm.   Pulmonary:      Effort: Pulmonary effort is normal.      Breath sounds: Normal breath sounds.   Abdominal:      General: Bowel sounds are normal.      Palpations: Abdomen is soft.   Skin:     General: Skin is warm and dry.   Neurological:      General: No focal deficit present.      Mental Status: He is alert and oriented to person, place, and time.   Psychiatric:         Mood and Affect: Mood normal.         Behavior: Behavior normal.         Debilities/Disabilities Identified: None    Emotional Behavior: Appropriate      /80   Pulse 72   Resp 16   Ht 175.3 cm (69\")   Wt 118 kg (260 lb)   BMI 38.40 kg/m²         Diagnoses and all orders for this visit:    1. Gallbladder sludge (Primary)    I discussed with Balaji the benefits and risks of performing a laparoscopic cholecystectomy with intraoperative cholangiogram possible open procedure.  Benefits and risks not limited to but including: Bleeding, infection, hernia formation, having to convert to an open procedure, injury to intra-abdominal structures, intra-abdominal abscess, intra-abdominal biloma, bile leak, DVT, PE, atelectasis, pneumonia, anesthetic complications.  He appeared to understand and is willing to proceed.    Thank you for allowing me to participate in the care of this interesting patient.        "

## 2025-02-05 NOTE — PROGRESS NOTES
Balaji Cisneros 49 y.o. male presents @ the req of MADELINE Virk for eval of GB SLUDGE.  Chief Complaint   Patient presents with    GB SLUDGE             HPI   Above noted agree.  This very pleasant 49-year-old male has been having issues with nausea and heartburn and bloating, as well as right upper quadrant pain.  He had an ultrasound that showed gallbladder sludge.      Review of Systems   All other systems reviewed and are negative.            Current Outpatient Medications:     aspirin 81 MG chewable tablet, Chew 1 tablet Daily., Disp: , Rfl:     atorvastatin (LIPITOR) 20 MG tablet, TAKE 1 TABLET BY MOUTH AT BEDTIME AS DIRECTED, Disp: , Rfl:         No Known Allergies        Past Medical History:   Diagnosis Date    Brain tumor (benign)     Headache     Hx of blood clots     Myocardial infarction            Past Surgical History:   Procedure Laterality Date    CARDIAC CATHETERIZATION             Social History     Tobacco Use    Smoking status: Former     Current packs/day: 0.00     Types: Cigarettes     Quit date: 2011     Years since quittin.0    Smokeless tobacco: Never   Vaping Use    Vaping status: Never Used   Substance Use Topics    Alcohol use: Yes     Comment: rarely           Immunization History   Administered Date(s) Administered    COVID-19 (PFIZER) Purple Cap Monovalent 2021, 2021           Physical Exam  Vitals and nursing note reviewed.   Constitutional:       Appearance: Normal appearance.   HENT:      Head: Normocephalic and atraumatic.   Cardiovascular:      Rate and Rhythm: Normal rate and regular rhythm.   Pulmonary:      Effort: Pulmonary effort is normal.      Breath sounds: Normal breath sounds.   Abdominal:      General: Bowel sounds are normal.      Palpations: Abdomen is soft.   Skin:     General: Skin is warm and dry.   Neurological:      General: No focal deficit present.      Mental Status: He is alert and oriented to person, place, and time.   Psychiatric:     "     Mood and Affect: Mood normal.         Behavior: Behavior normal.         Debilities/Disabilities Identified: None    Emotional Behavior: Appropriate      /80   Pulse 72   Resp 16   Ht 175.3 cm (69\")   Wt 118 kg (260 lb)   BMI 38.40 kg/m²         Diagnoses and all orders for this visit:    1. Gallbladder sludge (Primary)    I discussed with Balaji the benefits and risks of performing a laparoscopic cholecystectomy with intraoperative cholangiogram possible open procedure.  Benefits and risks not limited to but including: Bleeding, infection, hernia formation, having to convert to an open procedure, injury to intra-abdominal structures, intra-abdominal abscess, intra-abdominal biloma, bile leak, DVT, PE, atelectasis, pneumonia, anesthetic complications.  He appeared to understand and is willing to proceed.    Thank you for allowing me to participate in the care of this interesting patient.        "

## 2025-02-11 ENCOUNTER — PRE-ADMISSION TESTING (OUTPATIENT)
Dept: PREADMISSION TESTING | Facility: HOSPITAL | Age: 50
End: 2025-02-11
Payer: COMMERCIAL

## 2025-02-11 VITALS
DIASTOLIC BLOOD PRESSURE: 90 MMHG | WEIGHT: 254 LBS | OXYGEN SATURATION: 99 % | SYSTOLIC BLOOD PRESSURE: 130 MMHG | BODY MASS INDEX: 37.62 KG/M2 | HEART RATE: 91 BPM | HEIGHT: 69 IN

## 2025-02-11 LAB
QT INTERVAL: 387 MS
QTC INTERVAL: 455 MS

## 2025-02-11 PROCEDURE — 93005 ELECTROCARDIOGRAM TRACING: CPT

## 2025-02-11 RX ORDER — LOSARTAN POTASSIUM 25 MG/1
1 TABLET ORAL DAILY
COMMUNITY
Start: 2025-01-13

## 2025-02-11 RX ORDER — OMEPRAZOLE 40 MG/1
1 CAPSULE, DELAYED RELEASE ORAL DAILY
COMMUNITY
Start: 2025-01-13

## 2025-02-11 RX ORDER — ATORVASTATIN CALCIUM 10 MG/1
10 TABLET, FILM COATED ORAL NIGHTLY
COMMUNITY
End: 2025-02-11

## 2025-02-11 NOTE — DISCHARGE INSTRUCTIONS
PRE-ADMISSION TESTING INSTRUCTIONS FOR ADULTS    Take these medications the morning of surgery with a small sip of water: take meds at night per your routine       Do not take any insulin or diabetes medications the morning of surgery.    Baby asa ok - just dont take dos   No aspirin, advil, aleve, ibuprofen, naproxen, diet pills, decongestants, or herbal/vitamins for a week prior to surgery.       Tylenol/Acetaminophen is okay to take if needed.    General Instructions:    DO NOT EAT SOLID FOOD AFTER MIDNIGHT THE NIGHT BEFORE SURGERY. No gum, mints, or hard candy after midnight the night before surgery.  You may drink clear liquids the day of surgery up until 2 hours before your arrival time.  Clear liquids are liquids you can see through. Nothing RED in color.    Plain water    Sports drinks      Gelatin (Jell-O)  Fruit juices without pulp such as white grape juice and apple juice  Popsicles that contain no fruit or yogurt  Tea or coffee (no cream or milk added)    It is beneficial for you to have a clear drink that contains carbohydrates 2 hours before your arrival time.  We suggest a 20 ounce bottle of Gatorade or Powerade for non-diabetic patients or a 20 ounce bottle of Gatorade Zero or Powerade Zero for diabetic patients.     Patients who avoid smoking, chewing tobacco and alcohol for 4 weeks prior to surgery have a reduced risk of post-operative complications.  If at all possible, quit smoking as many days before surgery as you can.    Do not smoke, use chewing tobacco or drink alcohol the day of surgery    Bring your C-PAP/ BI-PAP machine if you use one.  Wear clean comfortable clothes.  Do not wear contact lenses, lotion, deodorant, or make-up.  Bring a case for your glasses if applicable. You may brush your teeth the morning of surgery.  You may wear dentures/partials, do not put adhesive/glue on them.  Leave all other jewelry and valuables at home.      Preventing a Surgical Site Infection:    Shower the  night before and on the morning of surgery using the chlorhexidine soap you were given.  Use a clean washcloth with the soap.  Place clean sheets on your bed after showering the night before surgery. Do not use the CHG soap on your hair, face, or private areas. Wash your body gently for five (5) minutes. Do not scrub your skin.  Dry with a clean towel and dress in clean clothing.  Do not shave the surgical area for 10 days-2 weeks prior to surgery  because the razor can irritate skin and make it easier to develop an infection.  Make sure you, your family, and all healthcare providers clean their hands with soap and water or an alcohol based hand  before caring for you or your wound.      Day of surgery:    Your surgeon’s office will advise you of your arrival time for the day of surgery.    Upon arrival, a Pre-op nurse and Anesthesia provider will review your health history, obtain vital signs, and answer questions you may have. The anesthesia provider will also discuss the type of anesthesia that will be needed for your procedure, which may include general anesthesia. The only belongings needed at this time will be your home medications and if applicable your C-PAP/BI-PAP machine.  If you are staying overnight your family can leave the rest of your belongings in the car and bring them to your room later.  A Pre-op nurse will start an IV and you may receive medication in preparation for surgery, including something to help you relax.  Your family will be able to see you in the Pre-op area.  While you are in surgery your family should notify the waiting room  if they leave the waiting room area and provide a contact phone number.    IF you have any questions, you can call the Pre-Admission Department at [phone] or your surgeon's office.  Notify your surgeon if  you become sick, have a fever, productive cough, or cannot be here the day of surgery    Please be aware that surgery does come  with discomfort.  We want to make every effort to control your discomfort so please discuss any uncontrolled symptoms with your nurse.   Your doctor will most likely have prescribed pain medications.      If you are going home after surgery, you will receive individualized written care instructions before being discharged.  A responsible adult (over the age of 18) must drive you to and from the hospital on the day of your surgery and stay with you for 24 hours after anesthesia.    If you are staying overnight following surgery, you will be transported to your hospital room following the recovery period.  [facility] has all private rooms.    You may receive a survey regarding the care you received. Your feedback is very important and will be used to collect the necessary data to help us to continue to provide excellent care.     Deductibles and co-payments are collected on the day of service. Please be prepared to pay the required co-pay, deductible or deposit on the day of service as defined by your plan.

## 2025-02-17 ENCOUNTER — ANESTHESIA EVENT (OUTPATIENT)
Dept: PERIOP | Facility: HOSPITAL | Age: 50
End: 2025-02-17
Payer: COMMERCIAL

## 2025-02-20 ENCOUNTER — ANESTHESIA (OUTPATIENT)
Dept: PERIOP | Facility: HOSPITAL | Age: 50
End: 2025-02-20
Payer: COMMERCIAL

## 2025-02-20 ENCOUNTER — HOSPITAL ENCOUNTER (OUTPATIENT)
Facility: HOSPITAL | Age: 50
Setting detail: HOSPITAL OUTPATIENT SURGERY
Discharge: HOME | End: 2025-02-20
Attending: SURGERY | Admitting: SURGERY
Payer: COMMERCIAL

## 2025-02-20 ENCOUNTER — APPOINTMENT (OUTPATIENT)
Dept: GENERAL RADIOLOGY | Facility: HOSPITAL | Age: 50
End: 2025-02-20
Payer: COMMERCIAL

## 2025-02-20 VITALS
RESPIRATION RATE: 23 BRPM | TEMPERATURE: 97.9 F | OXYGEN SATURATION: 93 % | HEART RATE: 66 BPM | WEIGHT: 250.8 LBS | BODY MASS INDEX: 37.04 KG/M2 | DIASTOLIC BLOOD PRESSURE: 74 MMHG | SYSTOLIC BLOOD PRESSURE: 114 MMHG

## 2025-02-20 DIAGNOSIS — K82.8 GALLBLADDER SLUDGE: ICD-10-CM

## 2025-02-20 LAB
ALBUMIN SERPL-MCNC: 4.1 G/DL (ref 3.5–5.2)
ALBUMIN/GLOB SERPL: 1.2 G/DL
ALP SERPL-CCNC: 97 U/L (ref 39–117)
ALT SERPL W P-5'-P-CCNC: 45 U/L (ref 1–41)
ANION GAP SERPL CALCULATED.3IONS-SCNC: 9.8 MMOL/L (ref 5–15)
AST SERPL-CCNC: 34 U/L (ref 1–40)
BASOPHILS # BLD AUTO: 0.05 10*3/MM3 (ref 0–0.2)
BASOPHILS NFR BLD AUTO: 0.8 % (ref 0–1.5)
BILIRUB SERPL-MCNC: 0.4 MG/DL (ref 0–1.2)
BUN SERPL-MCNC: 11 MG/DL (ref 6–20)
BUN/CREAT SERPL: 13.3 (ref 7–25)
CALCIUM SPEC-SCNC: 8.7 MG/DL (ref 8.6–10.5)
CHLORIDE SERPL-SCNC: 107 MMOL/L (ref 98–107)
CO2 SERPL-SCNC: 23.2 MMOL/L (ref 22–29)
CREAT SERPL-MCNC: 0.83 MG/DL (ref 0.76–1.27)
DEPRECATED RDW RBC AUTO: 40.3 FL (ref 37–54)
EGFRCR SERPLBLD CKD-EPI 2021: 106.6 ML/MIN/1.73
EOSINOPHIL # BLD AUTO: 0.57 10*3/MM3 (ref 0–0.4)
EOSINOPHIL NFR BLD AUTO: 9.4 % (ref 0.3–6.2)
ERYTHROCYTE [DISTWIDTH] IN BLOOD BY AUTOMATED COUNT: 13 % (ref 12.3–15.4)
GLOBULIN UR ELPH-MCNC: 3.4 GM/DL
GLUCOSE SERPL-MCNC: 121 MG/DL (ref 65–99)
HCT VFR BLD AUTO: 39.9 % (ref 37.5–51)
HGB BLD-MCNC: 13.7 G/DL (ref 13–17.7)
IMM GRANULOCYTES # BLD AUTO: 0 10*3/MM3 (ref 0–0.05)
IMM GRANULOCYTES NFR BLD AUTO: 0 % (ref 0–0.5)
LYMPHOCYTES # BLD AUTO: 2.34 10*3/MM3 (ref 0.7–3.1)
LYMPHOCYTES NFR BLD AUTO: 38.7 % (ref 19.6–45.3)
MCH RBC QN AUTO: 29.1 PG (ref 26.6–33)
MCHC RBC AUTO-ENTMCNC: 34.3 G/DL (ref 31.5–35.7)
MCV RBC AUTO: 84.7 FL (ref 79–97)
MONOCYTES # BLD AUTO: 0.5 10*3/MM3 (ref 0.1–0.9)
MONOCYTES NFR BLD AUTO: 8.3 % (ref 5–12)
NEUTROPHILS NFR BLD AUTO: 2.58 10*3/MM3 (ref 1.7–7)
NEUTROPHILS NFR BLD AUTO: 42.8 % (ref 42.7–76)
PLATELET # BLD AUTO: 286 10*3/MM3 (ref 140–450)
PMV BLD AUTO: 9 FL (ref 6–12)
POTASSIUM SERPL-SCNC: 3.9 MMOL/L (ref 3.5–5.2)
PROT SERPL-MCNC: 7.5 G/DL (ref 6–8.5)
RBC # BLD AUTO: 4.71 10*6/MM3 (ref 4.14–5.8)
SODIUM SERPL-SCNC: 140 MMOL/L (ref 136–145)
WBC NRBC COR # BLD AUTO: 6.04 10*3/MM3 (ref 3.4–10.8)

## 2025-02-20 PROCEDURE — 25010000002 PROPOFOL 200 MG/20ML EMULSION: Performed by: NURSE ANESTHETIST, CERTIFIED REGISTERED

## 2025-02-20 PROCEDURE — 25810000003 SODIUM CHLORIDE PER 500 ML: Performed by: SURGERY

## 2025-02-20 PROCEDURE — 47563 LAPARO CHOLECYSTECTOMY/GRAPH: CPT | Performed by: SURGERY

## 2025-02-20 PROCEDURE — 25010000002 DEXAMETHASONE PER 1 MG: Performed by: NURSE ANESTHETIST, CERTIFIED REGISTERED

## 2025-02-20 PROCEDURE — 80053 COMPREHEN METABOLIC PANEL: CPT | Performed by: SURGERY

## 2025-02-20 PROCEDURE — 47563 LAPARO CHOLECYSTECTOMY/GRAPH: CPT | Performed by: SPECIALIST/TECHNOLOGIST, OTHER

## 2025-02-20 PROCEDURE — 25010000002 CEFAZOLIN PER 500 MG: Performed by: SURGERY

## 2025-02-20 PROCEDURE — 25010000002 SUCCINYLCHOLINE PER 20 MG: Performed by: NURSE ANESTHETIST, CERTIFIED REGISTERED

## 2025-02-20 PROCEDURE — 25010000002 LIDOCAINE 2% SOLUTION: Performed by: NURSE ANESTHETIST, CERTIFIED REGISTERED

## 2025-02-20 PROCEDURE — C1887 CATHETER, GUIDING: HCPCS | Performed by: SURGERY

## 2025-02-20 PROCEDURE — 25010000002 MIDAZOLAM PER 1MG: Performed by: NURSE ANESTHETIST, CERTIFIED REGISTERED

## 2025-02-20 PROCEDURE — 25010000002 SUGAMMADEX 200 MG/2ML SOLUTION

## 2025-02-20 PROCEDURE — 25010000002 KETOROLAC TROMETHAMINE PER 15 MG

## 2025-02-20 PROCEDURE — 25010000002 FENTANYL CITRATE (PF) 50 MCG/ML SOLUTION: Performed by: NURSE ANESTHETIST, CERTIFIED REGISTERED

## 2025-02-20 PROCEDURE — 25810000003 LACTATED RINGERS PER 1000 ML: Performed by: NURSE ANESTHETIST, CERTIFIED REGISTERED

## 2025-02-20 PROCEDURE — 88304 TISSUE EXAM BY PATHOLOGIST: CPT | Performed by: SURGERY

## 2025-02-20 PROCEDURE — 25010000002 ONDANSETRON PER 1 MG: Performed by: NURSE ANESTHETIST, CERTIFIED REGISTERED

## 2025-02-20 PROCEDURE — 25510000001 IOPAMIDOL 61 % SOLUTION: Performed by: SURGERY

## 2025-02-20 PROCEDURE — 74300 X-RAY BILE DUCTS/PANCREAS: CPT

## 2025-02-20 PROCEDURE — 25010000002 BUPIVACAINE (PF) 0.5 % SOLUTION 30 ML VIAL: Performed by: SURGERY

## 2025-02-20 PROCEDURE — 25010000002 LIDOCAINE 1% - EPINEPHRINE 1:100000 1 %-1:100000 SOLUTION 20 ML VIAL: Performed by: SURGERY

## 2025-02-20 PROCEDURE — 85025 COMPLETE CBC W/AUTO DIFF WBC: CPT | Performed by: SURGERY

## 2025-02-20 DEVICE — LIGAMAX 5 MM ENDOSCOPIC MULTIPLE CLIP APPLIER
Type: IMPLANTABLE DEVICE | Site: ABDOMEN | Status: FUNCTIONAL
Brand: LIGAMAX

## 2025-02-20 RX ORDER — FENTANYL CITRATE 50 UG/ML
25 INJECTION, SOLUTION INTRAMUSCULAR; INTRAVENOUS
Status: DISCONTINUED | OUTPATIENT
Start: 2025-02-20 | End: 2025-02-20 | Stop reason: HOSPADM

## 2025-02-20 RX ORDER — ONDANSETRON 2 MG/ML
4 INJECTION INTRAMUSCULAR; INTRAVENOUS ONCE AS NEEDED
Status: DISCONTINUED | OUTPATIENT
Start: 2025-02-20 | End: 2025-02-20 | Stop reason: HOSPADM

## 2025-02-20 RX ORDER — MIDAZOLAM HYDROCHLORIDE 2 MG/2ML
1 INJECTION, SOLUTION INTRAMUSCULAR; INTRAVENOUS
Status: DISCONTINUED | OUTPATIENT
Start: 2025-02-20 | End: 2025-02-20 | Stop reason: HOSPADM

## 2025-02-20 RX ORDER — HYDROCODONE BITARTRATE AND ACETAMINOPHEN 5; 325 MG/1; MG/1
1 TABLET ORAL ONCE AS NEEDED
Status: COMPLETED | OUTPATIENT
Start: 2025-02-20 | End: 2025-02-20

## 2025-02-20 RX ORDER — SODIUM CHLORIDE, SODIUM LACTATE, POTASSIUM CHLORIDE, CALCIUM CHLORIDE 600; 310; 30; 20 MG/100ML; MG/100ML; MG/100ML; MG/100ML
9 INJECTION, SOLUTION INTRAVENOUS CONTINUOUS
Status: DISCONTINUED | OUTPATIENT
Start: 2025-02-20 | End: 2025-02-20 | Stop reason: HOSPADM

## 2025-02-20 RX ORDER — SODIUM CHLORIDE 9 MG/ML
INJECTION, SOLUTION INTRAVENOUS AS NEEDED
Status: DISCONTINUED | OUTPATIENT
Start: 2025-02-20 | End: 2025-02-20 | Stop reason: HOSPADM

## 2025-02-20 RX ORDER — KETAMINE HYDROCHLORIDE 10 MG/ML
INJECTION, SOLUTION INTRAMUSCULAR; INTRAVENOUS AS NEEDED
Status: DISCONTINUED | OUTPATIENT
Start: 2025-02-20 | End: 2025-02-20 | Stop reason: SURG

## 2025-02-20 RX ORDER — LIDOCAINE HYDROCHLORIDE 20 MG/ML
INJECTION, SOLUTION INFILTRATION; PERINEURAL AS NEEDED
Status: DISCONTINUED | OUTPATIENT
Start: 2025-02-20 | End: 2025-02-20 | Stop reason: SURG

## 2025-02-20 RX ORDER — DIPHENHYDRAMINE HYDROCHLORIDE 50 MG/ML
12.5 INJECTION INTRAMUSCULAR; INTRAVENOUS
Status: DISCONTINUED | OUTPATIENT
Start: 2025-02-20 | End: 2025-02-20 | Stop reason: HOSPADM

## 2025-02-20 RX ORDER — LIDOCAINE HYDROCHLORIDE 10 MG/ML
0.5 INJECTION, SOLUTION EPIDURAL; INFILTRATION; INTRACAUDAL; PERINEURAL ONCE AS NEEDED
Status: DISCONTINUED | OUTPATIENT
Start: 2025-02-20 | End: 2025-02-20 | Stop reason: HOSPADM

## 2025-02-20 RX ORDER — PROPOFOL 10 MG/ML
INJECTION, EMULSION INTRAVENOUS AS NEEDED
Status: DISCONTINUED | OUTPATIENT
Start: 2025-02-20 | End: 2025-02-20 | Stop reason: SURG

## 2025-02-20 RX ORDER — IOPAMIDOL 612 MG/ML
INJECTION, SOLUTION INTRAVASCULAR AS NEEDED
Status: DISCONTINUED | OUTPATIENT
Start: 2025-02-20 | End: 2025-02-20 | Stop reason: HOSPADM

## 2025-02-20 RX ORDER — KETOROLAC TROMETHAMINE 30 MG/ML
INJECTION, SOLUTION INTRAMUSCULAR; INTRAVENOUS AS NEEDED
Status: DISCONTINUED | OUTPATIENT
Start: 2025-02-20 | End: 2025-02-20 | Stop reason: SURG

## 2025-02-20 RX ORDER — ROCURONIUM BROMIDE 10 MG/ML
INJECTION, SOLUTION INTRAVENOUS AS NEEDED
Status: DISCONTINUED | OUTPATIENT
Start: 2025-02-20 | End: 2025-02-20 | Stop reason: SURG

## 2025-02-20 RX ORDER — SUCCINYLCHOLINE CHLORIDE 20 MG/ML
INJECTION INTRAMUSCULAR; INTRAVENOUS AS NEEDED
Status: DISCONTINUED | OUTPATIENT
Start: 2025-02-20 | End: 2025-02-20 | Stop reason: SURG

## 2025-02-20 RX ORDER — DEXMEDETOMIDINE HYDROCHLORIDE 100 UG/ML
INJECTION, SOLUTION INTRAVENOUS AS NEEDED
Status: DISCONTINUED | OUTPATIENT
Start: 2025-02-20 | End: 2025-02-20 | Stop reason: SURG

## 2025-02-20 RX ORDER — HYDROCODONE BITARTRATE AND ACETAMINOPHEN 5; 325 MG/1; MG/1
1 TABLET ORAL EVERY 6 HOURS PRN
Qty: 10 TABLET | Refills: 0 | Status: SHIPPED | OUTPATIENT
Start: 2025-02-20

## 2025-02-20 RX ORDER — ONDANSETRON 2 MG/ML
4 INJECTION INTRAMUSCULAR; INTRAVENOUS ONCE AS NEEDED
Status: COMPLETED | OUTPATIENT
Start: 2025-02-20 | End: 2025-02-20

## 2025-02-20 RX ORDER — SODIUM CHLORIDE 0.9 % (FLUSH) 0.9 %
10 SYRINGE (ML) INJECTION AS NEEDED
Status: DISCONTINUED | OUTPATIENT
Start: 2025-02-20 | End: 2025-02-20 | Stop reason: HOSPADM

## 2025-02-20 RX ORDER — FAMOTIDINE 10 MG/ML
20 INJECTION, SOLUTION INTRAVENOUS
Status: COMPLETED | OUTPATIENT
Start: 2025-02-20 | End: 2025-02-20

## 2025-02-20 RX ORDER — MAGNESIUM HYDROXIDE 1200 MG/15ML
LIQUID ORAL AS NEEDED
Status: DISCONTINUED | OUTPATIENT
Start: 2025-02-20 | End: 2025-02-20 | Stop reason: HOSPADM

## 2025-02-20 RX ORDER — DEXAMETHASONE SODIUM PHOSPHATE 4 MG/ML
8 INJECTION, SOLUTION INTRA-ARTICULAR; INTRALESIONAL; INTRAMUSCULAR; INTRAVENOUS; SOFT TISSUE ONCE AS NEEDED
Status: COMPLETED | OUTPATIENT
Start: 2025-02-20 | End: 2025-02-20

## 2025-02-20 RX ORDER — FENTANYL CITRATE 50 UG/ML
INJECTION, SOLUTION INTRAMUSCULAR; INTRAVENOUS AS NEEDED
Status: DISCONTINUED | OUTPATIENT
Start: 2025-02-20 | End: 2025-02-20 | Stop reason: SURG

## 2025-02-20 RX ADMIN — LIDOCAINE HYDROCHLORIDE 100 MG: 20 INJECTION, SOLUTION INFILTRATION; PERINEURAL at 10:44

## 2025-02-20 RX ADMIN — KETOROLAC TROMETHAMINE 30 MG: 30 INJECTION, SOLUTION INTRAMUSCULAR; INTRAVENOUS at 11:36

## 2025-02-20 RX ADMIN — FAMOTIDINE 20 MG: 10 INJECTION INTRAVENOUS at 08:17

## 2025-02-20 RX ADMIN — HYDROCODONE BITARTRATE AND ACETAMINOPHEN 1 TABLET: 5; 325 TABLET ORAL at 12:35

## 2025-02-20 RX ADMIN — DEXMEDETOMIDINE HYDROCHLORIDE 10 MCG: 100 INJECTION, SOLUTION INTRAVENOUS at 10:55

## 2025-02-20 RX ADMIN — SUGAMMADEX 250 MG: 100 INJECTION, SOLUTION INTRAVENOUS at 11:36

## 2025-02-20 RX ADMIN — PROPOFOL 50 MG: 10 INJECTION, EMULSION INTRAVENOUS at 11:37

## 2025-02-20 RX ADMIN — ROCURONIUM 5 MG: 50 INJECTION, SOLUTION INTRAVENOUS at 10:44

## 2025-02-20 RX ADMIN — SUCCINYLCHOLINE CHLORIDE 140 MG: 20 INJECTION, SOLUTION INTRAMUSCULAR; INTRAVENOUS at 10:44

## 2025-02-20 RX ADMIN — KETAMINE HYDROCHLORIDE 20 MG: 10 INJECTION, SOLUTION INTRAMUSCULAR; INTRAVENOUS at 10:44

## 2025-02-20 RX ADMIN — MIDAZOLAM HYDROCHLORIDE 1 MG: 1 INJECTION, SOLUTION INTRAMUSCULAR; INTRAVENOUS at 10:36

## 2025-02-20 RX ADMIN — DEXMEDETOMIDINE HYDROCHLORIDE 10 MCG: 100 INJECTION, SOLUTION INTRAVENOUS at 11:07

## 2025-02-20 RX ADMIN — PROPOFOL 50 MG: 10 INJECTION, EMULSION INTRAVENOUS at 11:33

## 2025-02-20 RX ADMIN — PROPOFOL 50 MG: 10 INJECTION, EMULSION INTRAVENOUS at 11:41

## 2025-02-20 RX ADMIN — PROPOFOL 180 MG: 10 INJECTION, EMULSION INTRAVENOUS at 10:44

## 2025-02-20 RX ADMIN — ROCURONIUM 20 MG: 50 INJECTION, SOLUTION INTRAVENOUS at 10:50

## 2025-02-20 RX ADMIN — DEXAMETHASONE SODIUM PHOSPHATE 8 MG: 4 INJECTION, SOLUTION INTRAMUSCULAR; INTRAVENOUS at 08:17

## 2025-02-20 RX ADMIN — KETAMINE HYDROCHLORIDE 20 MG: 10 INJECTION, SOLUTION INTRAMUSCULAR; INTRAVENOUS at 11:07

## 2025-02-20 RX ADMIN — ROCURONIUM 10 MG: 50 INJECTION, SOLUTION INTRAVENOUS at 11:06

## 2025-02-20 RX ADMIN — CEFAZOLIN 2000 MG: 2 INJECTION, POWDER, FOR SOLUTION INTRAVENOUS at 10:48

## 2025-02-20 RX ADMIN — ONDANSETRON 4 MG: 2 INJECTION INTRAMUSCULAR; INTRAVENOUS at 08:17

## 2025-02-20 RX ADMIN — SODIUM CHLORIDE, POTASSIUM CHLORIDE, SODIUM LACTATE AND CALCIUM CHLORIDE 9 ML/HR: 600; 310; 30; 20 INJECTION, SOLUTION INTRAVENOUS at 08:17

## 2025-02-20 RX ADMIN — FENTANYL CITRATE 50 MCG: 50 INJECTION, SOLUTION INTRAMUSCULAR; INTRAVENOUS at 10:44

## 2025-02-20 NOTE — INTERVAL H&P NOTE
H&P reviewed. The patient was examined and there are no changes to the H&P.      /87 (BP Location: Left arm, Patient Position: Lying)   Pulse 80   Temp 98.4 °F (36.9 °C) (Oral)   Resp 18   Wt 114 kg (250 lb 12.8 oz)   SpO2 96%   BMI 37.04 kg/m²

## 2025-02-20 NOTE — ANESTHESIA PREPROCEDURE EVALUATION
Anesthesia Evaluation     Patient summary reviewed and Nursing notes reviewed   no history of anesthetic complications:   NPO Solid Status: > 8 hours  NPO Liquid Status: > 8 hours           Airway   Mallampati: III  TM distance: >3 FB  Possible difficult intubation  Dental - normal exam     Pulmonary - normal exam    breath sounds clear to auscultation  (+) a smoker (quit 12 years ago) Former, cigarettes,sleep apnea on CPAP  (-) shortness of breath, recent URI  Cardiovascular - normal exam    ECG reviewed  PT is on anticoagulation therapy  Rhythm: regular  Rate: normal    (+) hypertension well controlled less than 2 medications, past MI (12 years)  >12 months, DVT (in back 8 years ago) resolved, hyperlipidemia  (-) angina    ROS comment: Baby asa    Sinus rhythm  ST elev, probable normal early repol pattern  NO PRIOR TRACING AVAILABLE FOR COMPARISON  Electronically Signed By: Emanuel Mitchell ([facility]) 2025-02-11 17:19:23  Date and Time of Study:2025-02-11 14:08:14      Neuro/Psych- negative ROS  GI/Hepatic/Renal/Endo    (+) obesity, GERD well controlled, liver disease fatty liver disease    Musculoskeletal     (+) back pain (DDD)  (-) neck pain, neck stiffness  Abdominal   (+) obese    Abdomen: soft.   Substance History   (+) alcohol use (rarely)  (-) drug use     OB/GYN          Other   arthritis,                   Anesthesia Plan    ASA 3     general     intravenous induction     Anesthetic plan, risks, benefits, and alternatives have been provided, discussed and informed consent has been obtained with: patient and spouse/significant other.    Use of blood products discussed with patient and spouse/significant other .    Plan discussed with CRNA.    CODE STATUS:

## 2025-02-20 NOTE — OP NOTE
PREOPERATIVE DIAGNOSIS: Gallbladder sludge  POSTOPERATIVE DIAGNOSIS: Chronic cholecystitis   PROCEDURE: Laparoscopic cholecystectomy with Intraoperative cholangiogram  SURGEON/STAFF: Jayda  Assistant: Emiliana Tyler CSA was responsible for performing the following activities: Retraction, Closing, Placing Dressing, and Held/Positioned Camera and their skilled assistance was necessary for the success of this case.     SPECIMENS: Gallbladder.  INTRAOPERATIVE COMPLICATIONS: None.  ANESTHESIA: General.  BLOOD LOSS:  minimal  COUNTS: Needle and sponge counts correct.     Clinical Note: This very pleasant patient presented to my office with the aforementioned complaints.  Benefits and risks of a laparoscopic cholecystectomy with intraoperative cholangiogram possible open procedure were discussed.  Benefits and risks not limited to but including:Bleeding, infection, hernia formation, injury to intra-abdominal structures, bile leak, biloma, intra-abdominal abscess, DVT, PE, atelectasis pneumonia, anesthesia complications. He agreed and was consented for operative management without duress.     PROCEDURE: The patient was brought to the operating room in stable condition. Perioperative antibiotics were given and sequential compression devices applied. He was then laid supine on the operating room table. General anesthesia was induced by the Anesthesia Service without difficulty.     At this time, the patient's abdomen was accessed at the level of the umbilicus in open cutdown technique and insertion of a 12-mm trocar. The abdomen was then insufflated. Brief survey of the abdominal cavity revealed no evidence of injury from insertion of the trocar, or no other intraabdominal pathology. At this time the patient was placed in steep reverse Trendelenburg and a slightly left-side down position. Three additional 5-mm trocars were placed, all in the standard position. This was under direct vision.       The peritoneal attachment of  the gallbladder at the level of the infundibulum was scored from laterally to medially, terminating at the level of the hepatic edge. The peritoneum was gently stripped down, exposing the underlying cystic artery and cystic duct. This was also done on the lateral side of the gallbladder by reflecting the gallbladder medially. The peritoneal attachment here was again gently dissected inferiorly. After completely exposing the cystic duct as well as cystic artery, a retro-cystic window was created. These 2 structures, the cystic duct and cystic artery, were further delineated. A firm critical view was obtained, visualizing healthy-appearing hepatic tissue behind the 2 structures, with no evidence of looping, bridging or tenting of the common bile duct.     A clip was placed distally at the cystic duct and a cystic duct incision with laparoscopic scissors was performed. A percutaneous cholangio-catheter was inserted into the patient abdomen. The catheter was placed and secured into the cystic duct. Cholangiogram was performed that showed normal cystic duct, normal hepatic ducts, normal common bile duct with good spillage of dye into the duodenum with no evidence of any obstructions. The cholangiocatheter was removed and the distal portion of thecystic duct was then clipped twice and ligated.  The cystic artery was then triply clipped and ligated.  It was inspected and seen to be in intact. The gallbladder was then carefully dissected off the hepatic bed using hook electrocautery. It was firmly adherent to the underlying bed, and an effort careful and meticulous hemostasis was undertaken. The gallbladder, after being removed from the hepatic bed, was placed in an EndoCatch bag. It was removed through the umbilical trocar without difficulty.    Next, the umbilical trocar was reinserted into the abdomen and the liver bed was inspected. Hemostasis was perfected. Copious irrigation was carried out. The clips were intact and  there was no bleeding or bile leakage noted.  The trocars were then removed under direct vision, air was deflated from the abdomen, and a Dueñas trocar site fascia was closed with 0 Vicryl suture.  The incisions were then closed with 40 Vicryls suture. Sterile dressings were applied.    Anesthesia was reversed, the ET tube and OG tube were removed.  And the patient was taken into the recovery area in stable postoperative condition having tolerated the procedure well.    MYRA Montelongo DO

## 2025-02-20 NOTE — ANESTHESIA POSTPROCEDURE EVALUATION
Patient: Balaji Cisneros    Procedure Summary       Date: 02/20/25 Room / Location:  [facility] 4 /  [facility]    Anesthesia Start: 1040 Anesthesia Stop: 1204    Procedure: CHOLECYSTECTOMY LAPAROSCOPIC INTRAOPERATIVE CHOLANGIOGRAM (Abdomen) Diagnosis:       Gallbladder sludge      (Gallbladder sludge [K82.8])    Surgeons: Magda Montelongo DO Provider: Sophia Ferrell CRNA    Anesthesia Type: general ASA Status: 3            Anesthesia Type: general    Vitals  Vitals Value Taken Time   /79 02/20/25 1235   Temp 98.4 °F (36.9 °C) 02/20/25 1157   Pulse 79 02/20/25 1238   Resp 19 02/20/25 1225   SpO2 90 % 02/20/25 1238   Vitals shown include unfiled device data.        Post Anesthesia Care and Evaluation    Patient location during evaluation: bedside  Patient participation: complete - patient participated  Level of consciousness: awake and alert  Pain score: 2  Pain management: adequate    Airway patency: patent  Anesthetic complications: No anesthetic complications  PONV Status: none  Cardiovascular status: acceptable  Respiratory status: acceptable  Hydration status: acceptable

## 2025-02-21 LAB
CYTO UR: NORMAL
LAB AP CASE REPORT: NORMAL
PATH REPORT.FINAL DX SPEC: NORMAL
PATH REPORT.GROSS SPEC: NORMAL

## 2025-03-05 ENCOUNTER — OFFICE VISIT (OUTPATIENT)
Dept: SURGERY | Facility: CLINIC | Age: 50
End: 2025-03-05
Payer: COMMERCIAL

## 2025-03-05 DIAGNOSIS — Z90.49 STATUS POST LAPAROSCOPIC CHOLECYSTECTOMY: Primary | ICD-10-CM

## 2025-03-05 PROCEDURE — 99024 POSTOP FOLLOW-UP VISIT: CPT | Performed by: SURGERY

## 2025-03-05 NOTE — PROGRESS NOTES
Balaji Cisneros 49 y.o. male presents for PO FU Lap Nicole.  Pt is feeling better.    History of Present Illness  The patient is a 49-year-old male who presents for follow-up after gallbladder surgery.    He reports a general sense of well-being but experiences mild discomfort in the umbilical region when bending over to  objects. He also describes a persistent soreness, the cause of which he is uncertain, speculating it may be related to indigestion. His appetite remains unaffected, and he has no issues with bowel movements. Additionally, he mentions occasional tingling and stinging sensations on the left upper side, the timing of which appears to be sporadic.      Review of Systems        Past Medical History:   Diagnosis Date    Brain tumor (benign)     follows with Neurology    Fatty liver     GERD (gastroesophageal reflux disease)     Headache     Hx of blood clots     in back - no blood thinner approx 8 years ago    Hyperlipidemia     Hypertension     Myocardial infarction            Past Surgical History:   Procedure Laterality Date    CARDIAC CATHETERIZATION      CHOLECYSTECTOMY WITH INTRAOPERATIVE CHOLANGIOGRAM N/A 2/20/2025    Procedure: CHOLECYSTECTOMY LAPAROSCOPIC INTRAOPERATIVE CHOLANGIOGRAM;  Surgeon: Magda Montelongo DO;  Location: [facility];  Service: General;  Laterality: N/A;           Physical Exam  Physical Exam   General:  Awake and alert with no acute distress  Eyes:  No icterus  Abdomen:  Soft, non-tender  Incision:  Clean, dry, intact       There were no vitals taken for this visit.        There are no diagnoses linked to this encounter.  Assessment & Plan  1. Postoperative status following cholecystectomy.  He is demonstrating satisfactory progress post-surgery. The discomfort he experiences is likely due to muscle spasms or soreness, which are common following such a procedure. He had chronic infection of his gallbladder. He has been advised that these symptoms should subside over  time. He has been informed that there are no longer any restrictions on his activities, including lifting. However, he has been cautioned against attempting to lift objects that were beyond his capacity prior to the surgery. A work note will be provided for him.    PROCEDURE  The patient underwent cholecystectomy.    Patient or patient representative verbalized consent for the use of Ambient Listening during the visit with  Magda Montelongo DO for chart documentation. 3/5/2025  13:28 EST    Answers submitted by the patient for this visit:  Post Operative Visit (Submitted on 3/4/2025)  Chief Complaint: Follow-up  Pain Control: partially controlled  Fever: no fever  Diet: adequate intake  Activity: normal  Operative Site Issues: No

## 2025-03-05 NOTE — LETTER
March 5, 2025     MADELINE Ovalles  [facility]  [facility] [id number]    Patient: Balaji Cisneros   YOB: 1975   Date of Visit: 3/5/2025     Dear MADELINE Ovalles:       Thank you for referring Balaji Cisneros to me for evaluation. Below are the relevant portions of my assessment and plan of care.    If you have questions, please do not hesitate to call me. I look forward to following Balaji along with you.         Sincerely,        Magda Montelongo DO        CC: No Recipients    Magda Montelongo DO  03/05/25 1342  Sign when Signing Visit  Balaji Cisneros 49 y.o. male presents for PO FU Lap Nicole.  Pt is feeling better.    History of Present Illness  The patient is a 49-year-old male who presents for follow-up after gallbladder surgery.    He reports a general sense of well-being but experiences mild discomfort in the umbilical region when bending over to  objects. He also describes a persistent soreness, the cause of which he is uncertain, speculating it may be related to indigestion. His appetite remains unaffected, and he has no issues with bowel movements. Additionally, he mentions occasional tingling and stinging sensations on the left upper side, the timing of which appears to be sporadic.      Review of Systems        Past Medical History:   Diagnosis Date   • Brain tumor (benign)     follows with Neurology   • Fatty liver    • GERD (gastroesophageal reflux disease)    • Headache    • Hx of blood clots     in back - no blood thinner approx 8 years ago   • Hyperlipidemia    • Hypertension    • Myocardial infarction            Past Surgical History:   Procedure Laterality Date   • CARDIAC CATHETERIZATION     • CHOLECYSTECTOMY WITH INTRAOPERATIVE CHOLANGIOGRAM N/A 2/20/2025    Procedure: CHOLECYSTECTOMY LAPAROSCOPIC INTRAOPERATIVE CHOLANGIOGRAM;  Surgeon: Magda Montelongo DO;  Location: [facility];  Service: General;  Laterality: N/A;           Physical Exam  Physical  Exam   General:  Awake and alert with no acute distress  Eyes:  No icterus  Abdomen:  Soft, non-tender  Incision:  Clean, dry, intact       There were no vitals taken for this visit.        There are no diagnoses linked to this encounter.  Assessment & Plan  1. Postoperative status following cholecystectomy.  He is demonstrating satisfactory progress post-surgery. The discomfort he experiences is likely due to muscle spasms or soreness, which are common following such a procedure. He had chronic infection of his gallbladder. He has been advised that these symptoms should subside over time. He has been informed that there are no longer any restrictions on his activities, including lifting. However, he has been cautioned against attempting to lift objects that were beyond his capacity prior to the surgery. A work note will be provided for him.    PROCEDURE  The patient underwent cholecystectomy.    Patient or patient representative verbalized consent for the use of Ambient Listening during the visit with  Magda Montelongo DO for chart documentation. 3/5/2025  13:28 EST    Answers submitted by the patient for this visit:  Post Operative Visit (Submitted on 3/4/2025)  Chief Complaint: Follow-up  Pain Control: partially controlled  Fever: no fever  Diet: adequate intake  Activity: normal  Operative Site Issues: No

## 2025-04-17 ENCOUNTER — OFFICE VISIT (OUTPATIENT)
Dept: ORTHOPEDIC SURGERY | Facility: CLINIC | Age: 50
End: 2025-04-17
Payer: COMMERCIAL

## 2025-04-17 VITALS
WEIGHT: 250 LBS | DIASTOLIC BLOOD PRESSURE: 77 MMHG | BODY MASS INDEX: 37.03 KG/M2 | SYSTOLIC BLOOD PRESSURE: 121 MMHG | HEART RATE: 83 BPM | HEIGHT: 69 IN

## 2025-04-17 DIAGNOSIS — M25.561 ACUTE PAIN OF BOTH KNEES: Primary | ICD-10-CM

## 2025-04-17 DIAGNOSIS — M17.11 PRIMARY OSTEOARTHRITIS OF RIGHT KNEE: ICD-10-CM

## 2025-04-17 DIAGNOSIS — M17.12 PRIMARY OSTEOARTHRITIS OF LEFT KNEE: ICD-10-CM

## 2025-04-17 DIAGNOSIS — M25.562 ACUTE PAIN OF BOTH KNEES: Primary | ICD-10-CM

## 2025-04-17 RX ORDER — MELOXICAM 15 MG/1
15 TABLET ORAL DAILY
Qty: 30 TABLET | Refills: 5 | Status: SHIPPED | OUTPATIENT
Start: 2025-04-17

## 2025-04-17 RX ORDER — PREDNISONE 10 MG/1
20 TABLET ORAL DAILY
Qty: 36 TABLET | Refills: 0 | Status: SHIPPED | OUTPATIENT
Start: 2025-04-17

## 2025-04-17 NOTE — PROGRESS NOTES
Subjective: Bilateral knee pain     Patient ID: Balaji Cisneros is a 50 y.o. male.    Chief Complaint:    History of Present Illness 50-year-old male is seen for bilateral knee pain that he has had for the past couple weeks although he states the right knee has been symptomatic for longer period of time.  No history of trauma but notes moderate to severe pain in both knees describing it 5 out of 10.  Has a grinding achy burning discomfort.  Has trouble with swelling and sensation of the knee giving way and has trouble standing sitting and climbing stairs.  Is taken occasional ibuprofen with no relief of his symptoms.  States he had x-rays done at [place] which showed evidence of a previous fracture.  His work does require him to be on his feet all day long.       Social History     Occupational History    Not on file   Tobacco Use    Smoking status: Former     Current packs/day: 0.00     Types: Cigarettes     Quit date: 2011     Years since quittin.2     Passive exposure: Past    Smokeless tobacco: Never   Vaping Use    Vaping status: Never Used   Substance and Sexual Activity    Alcohol use: Yes     Comment: rarely    Drug use: Never    Sexual activity: Defer      Review of Systems   Constitutional:  Negative for chills, diaphoresis, fever and unexpected weight change.   HENT:  Negative for hearing loss, nosebleeds, sore throat and tinnitus.    Eyes:  Negative for pain and visual disturbance.   Respiratory:  Negative for cough, shortness of breath and wheezing.    Cardiovascular:  Negative for chest pain and palpitations.   Gastrointestinal:  Negative for abdominal pain, diarrhea, nausea and vomiting.   Endocrine: Negative for cold intolerance, heat intolerance and polydipsia.   Genitourinary:  Negative for difficulty urinating, dysuria and hematuria.   Musculoskeletal:  Positive for arthralgias and myalgias. Negative for joint swelling.   Skin:  Negative for rash and wound.   Allergic/Immunologic:  Negative for environmental allergies.   Neurological:  Negative for dizziness, syncope and numbness.   Hematological:  Does not bruise/bleed easily.   Psychiatric/Behavioral:  Negative for dysphoric mood and sleep disturbance. The patient is not nervous/anxious.          Past Medical History:   Diagnosis Date    Brain tumor (benign)     follows with Neurology    Fatty liver     GERD (gastroesophageal reflux disease)     Headache     Hx of blood clots     in back - no blood thinner approx 8 years ago    Hyperlipidemia     Hypertension     Myocardial infarction      Past Surgical History:   Procedure Laterality Date    CARDIAC CATHETERIZATION      CHOLECYSTECTOMY WITH INTRAOPERATIVE CHOLANGIOGRAM N/A 2/20/2025    Procedure: CHOLECYSTECTOMY LAPAROSCOPIC INTRAOPERATIVE CHOLANGIOGRAM;  Surgeon: Magda Montelongo DO;  [facility];  Service: General;  Laterality: N/A;     History reviewed. No pertinent family history.      Objective:  Vitals:    04/17/25 1333   BP: 121/77   Pulse: 83         04/17/25  1333   Weight: 113 kg (250 lb)     Body mass index is 36.92 kg/m².  Class 2 Severe Obesity (BMI >=35 and <=39.9). Obesity-related health conditions include the following:    . Obesity is    . BMI is    . We discussed portion control, increasing exercise, and   .        Ortho Exam   AP lateral sunrise view of both knees done in the office today does show some early arthritic changes in the patellofemoral medial compartment of both knees.  He is not bone-on-bone but early changes are noted.  He is alert and oriented x 3.  Head is normocephalic and sclerae clear.  Neither knee shows any swelling effusion erythema in the both cool to touch.  The right knee has 0 to 130 degrees of motion with mild patellofemoral crepitance.  Quad hamstring function 5/5.  No instability to flexion extension nor any varus or valgus instability 10 to 30 degrees.  There is marked medial joint line tenderness but a negative Kyler's.  His  calf is nontender.  Is good distal pulses no motor deficit.  The left knee has 0 to 130 years of motion with minimal patellofemoral crepitus.  Quad hamstring function is 5/5.  No instability in flexion extension or any varus or valgus instability at 10 to 30 degrees.  Again marked medial joint line tenderness but negative Kyler's.  Calf is nontender.  Good to pulses.  Elects ibuprofen he takes him again is not taking therapeutic dosage on a regular basis.    Assessment:        1. Acute pain of both knees    2. Primary osteoarthritis of left knee    3. Primary osteoarthritis of right knee           Plan: Reviewed with the patient his history x-ray and physical exam.  He is developing early osteoarthritic changes in both knees.  After reviewing treatment options we will first begin a 9-day course of 20 mg of prednisone followed by meloxicam 15 mg a day.  Return to see me in 4 weeks for follow-up.  Answered all questions            Work Status:    ARACELI query complete.    Orders:  Orders Placed This Encounter   Procedures    XR Knee 3 View Bilateral       Medications:  New Medications Ordered This Visit   Medications    predniSONE (DELTASONE) 10 MG tablet     Sig: Take 2 tablets by mouth Daily. Take 20 mg 3 times a day for 3 days then 20 mg twice a day for 3 days then 20 mg once a day for 3 days.  Follow completion the prednisone begin meloxicam 15 mg daily     Dispense:  36 tablet     Refill:  0    meloxicam (MOBIC) 15 MG tablet     Sig: Take 1 tablet by mouth Daily.     Dispense:  30 tablet     Refill:  5       Followup:  Return in about 4 weeks (around 5/15/2025).          Dictated utilizing Dragon dictation

## 2025-04-18 ENCOUNTER — PATIENT ROUNDING (BHMG ONLY) (OUTPATIENT)
Dept: ORTHOPEDIC SURGERY | Facility: CLINIC | Age: 50
End: 2025-04-18
Payer: COMMERCIAL

## 2025-04-18 NOTE — PROGRESS NOTES
A My-Chart message has been sent to the patient for PATIENT ROUNDING with [facility].

## 2025-05-15 ENCOUNTER — OFFICE VISIT (OUTPATIENT)
Dept: ORTHOPEDIC SURGERY | Facility: CLINIC | Age: 50
End: 2025-05-15
Payer: COMMERCIAL

## 2025-05-15 VITALS — HEIGHT: 69 IN | BODY MASS INDEX: 37.03 KG/M2 | WEIGHT: 250 LBS

## 2025-05-15 DIAGNOSIS — M17.12 PRIMARY OSTEOARTHRITIS OF LEFT KNEE: Primary | ICD-10-CM

## 2025-05-15 DIAGNOSIS — M17.11 PRIMARY OSTEOARTHRITIS OF RIGHT KNEE: ICD-10-CM

## 2025-05-15 RX ORDER — METHOCARBAMOL 750 MG/1
1 TABLET, FILM COATED ORAL 3 TIMES DAILY
COMMUNITY
Start: 2025-05-06

## 2025-05-15 RX ORDER — MELOXICAM 15 MG/1
15 TABLET ORAL DAILY
Qty: 30 TABLET | Refills: 5 | Status: SHIPPED | OUTPATIENT
Start: 2025-05-15

## 2025-05-15 RX ORDER — DICLOFENAC SODIUM 75 MG/1
1 TABLET, DELAYED RELEASE ORAL EVERY 12 HOURS SCHEDULED
COMMUNITY
Start: 2025-04-29 | End: 2025-05-15

## 2025-05-15 NOTE — PROGRESS NOTES
Subjective: Bilateral knee pain left worse than right     Patient ID: Balaji Cisneros is a 50 y.o. male.    Chief Complaint:    History of Present Illness patient returns experiencing severe pain in both knees.  The 9 days of steroids offered very little of any relief and in fact the pain in the left knee got so bad he had to go to the ER at [place].  He subsequently saw his primary care physician who referred him back here saying he may need an MRI.  The pain in the left knee describes as 10 out of 10 with any all activity.       Social History     Occupational History    Not on file   Tobacco Use    Smoking status: Former     Current packs/day: 0.00     Types: Cigarettes     Quit date: 2011     Years since quittin.3     Passive exposure: Past    Smokeless tobacco: Never   Vaping Use    Vaping status: Never Used   Substance and Sexual Activity    Alcohol use: Yes     Alcohol/week: 1.0 standard drink of alcohol     Types: 1 Cans of beer per week     Comment: rarely    Drug use: Never    Sexual activity: Yes     Partners: Female      Review of Systems   Musculoskeletal:  Positive for arthralgias and myalgias.         Past Medical History:   Diagnosis Date    Arthritis of neck     Brain tumor (benign)     follows with Neurology    Cervical disc disorder     CTS (carpal tunnel syndrome)     Fatty liver     GERD (gastroesophageal reflux disease)     Headache     Hx of blood clots     in back - no blood thinner approx 8 years ago    Hyperlipidemia     Hypertension     Knee swelling     Myocardial infarction     Rotator cuff syndrome      Past Surgical History:   Procedure Laterality Date    CARDIAC CATHETERIZATION      CHOLECYSTECTOMY WITH INTRAOPERATIVE CHOLANGIOGRAM N/A 2025    Procedure: CHOLECYSTECTOMY LAPAROSCOPIC INTRAOPERATIVE CHOLANGIOGRAM;  Surgeon: Magda Montelongo DO;  Location: [facility];  Service: General;  Laterality: N/A;     History reviewed. No pertinent family  history.      Objective:  There were no vitals filed for this visit.      05/15/25  1528   Weight: 113 kg (250 lb)     Body mass index is 36.92 kg/m².           Ortho Exam   he is alert and oriented x 3.  The left knee shows no swelling effusion erythema but there is marked pain with range of motion to 0 to 125 degrees.  Mild patellofemoral crepitus.  No instability but the severe joint line tenderness on the medial side.  Negative Kyler's calf nontender.    Assessment:        1. Primary osteoarthritis of left knee    2. Primary osteoarthritis of right knee           Plan: Reviewed with the patient his symptoms and exam and history.  Again he is having severe pain in the left knee and  oral steroids offered no relief.  I want to get an MRI to fully evaluate particular for an os chondral lesion causing the  severe pain before making further recommendations.  Off work until seen back.  In the meantime continue the meloxicam and he can take Tylenol 1000 mg every 6 hours.  Patient was in agreement            Work Status:    ARAECLI query complete.    Orders:  No orders of the defined types were placed in this encounter.      Medications:  New Medications Ordered This Visit   Medications    meloxicam (MOBIC) 15 MG tablet     Sig: Take 1 tablet by mouth Daily.     Dispense:  30 tablet     Refill:  5       Followup:  Return in about 4 weeks (around 6/12/2025).          Dictated utilizing Dragon dictation

## 2025-06-13 ENCOUNTER — HOSPITAL ENCOUNTER (OUTPATIENT)
Dept: MRI IMAGING | Facility: HOSPITAL | Age: 50
Discharge: HOME | End: 2025-06-13
Admitting: ORTHOPAEDIC SURGERY
Payer: COMMERCIAL

## 2025-06-13 DIAGNOSIS — M17.12 PRIMARY OSTEOARTHRITIS OF LEFT KNEE: ICD-10-CM

## 2025-06-13 PROCEDURE — 73721 MRI JNT OF LWR EXTRE W/O DYE: CPT

## 2025-06-18 ENCOUNTER — OFFICE VISIT (OUTPATIENT)
Dept: ORTHOPEDIC SURGERY | Facility: CLINIC | Age: 50
End: 2025-06-18
Payer: COMMERCIAL

## 2025-06-18 VITALS — HEIGHT: 69 IN | WEIGHT: 250 LBS | BODY MASS INDEX: 37.03 KG/M2

## 2025-06-18 DIAGNOSIS — M17.12 PRIMARY OSTEOARTHRITIS OF LEFT KNEE: Primary | ICD-10-CM

## 2025-06-18 RX ORDER — ATORVASTATIN CALCIUM 10 MG/1
TABLET, FILM COATED ORAL
COMMUNITY
Start: 2025-06-09

## 2025-06-18 RX ORDER — TRIAMCINOLONE ACETONIDE 40 MG/ML
80 INJECTION, SUSPENSION INTRA-ARTICULAR; INTRAMUSCULAR
Status: COMPLETED | OUTPATIENT
Start: 2025-06-18 | End: 2025-06-18

## 2025-06-18 RX ORDER — LIDOCAINE HYDROCHLORIDE 10 MG/ML
4 INJECTION, SOLUTION EPIDURAL; INFILTRATION; INTRACAUDAL; PERINEURAL
Status: COMPLETED | OUTPATIENT
Start: 2025-06-18 | End: 2025-06-18

## 2025-06-18 RX ADMIN — LIDOCAINE HYDROCHLORIDE 4 ML: 10 INJECTION, SOLUTION EPIDURAL; INFILTRATION; INTRACAUDAL; PERINEURAL at 08:24

## 2025-06-18 RX ADMIN — TRIAMCINOLONE ACETONIDE 80 MG: 40 INJECTION, SUSPENSION INTRA-ARTICULAR; INTRAMUSCULAR at 08:24

## 2025-06-18 NOTE — PROGRESS NOTES
Subjective: Osteoarthritis left knee     Patient ID: Balaji Cisneros is a 50 y.o. male.    Chief Complaint:    History of Present Illness 50-year-old male returns to review the results of the MRI of his left knee.  He continues to have severe pain making it difficult to ambulate on level ground and navigate steps.  The MRI whose images and report are personally reviewed shows a horizontal tear of the medial meniscus but without displacement.  It does show full-thickness cartilage loss in the medial compartment of the knee.  He continues to have severe pain.       Social History     Occupational History    Not on file   Tobacco Use    Smoking status: Former     Current packs/day: 0.00     Average packs/day: 1.5 packs/day for 15.0 years (22.5 ttl pk-yrs)     Types: Cigarettes     Quit date: 2011     Years since quittin.4     Passive exposure: Past    Smokeless tobacco: Never   Vaping Use    Vaping status: Never Used   Substance and Sexual Activity    Alcohol use: Yes     Alcohol/week: 1.0 standard drink of alcohol     Types: 1 Cans of beer per week     Comment: rarely    Drug use: Never    Sexual activity: Yes     Partners: Female      Review of Systems      Past Medical History:   Diagnosis Date    Arthritis of neck     Brain tumor (benign)     follows with Neurology    Cervical disc disorder     CTS (carpal tunnel syndrome)     Fatty liver     GERD (gastroesophageal reflux disease)     Headache     Hx of blood clots     in back - no blood thinner approx 8 years ago    Hyperlipidemia     Hypertension     Knee swelling     Myocardial infarction     Rotator cuff syndrome      Past Surgical History:   Procedure Laterality Date    CARDIAC CATHETERIZATION      CHOLECYSTECTOMY WITH INTRAOPERATIVE CHOLANGIOGRAM N/A 2025    Procedure: CHOLECYSTECTOMY LAPAROSCOPIC INTRAOPERATIVE CHOLANGIOGRAM;  Surgeon: Magda Montelongo DO;  Location: [facility];  Service: General;  Laterality: N/A;     Family History   Problem  Relation Age of Onset    Diabetes Mother          Objective:  There were no vitals filed for this visit.      06/18/25  0809   Weight: 113 kg (250 lb)     Body mass index is 36.92 kg/m².           Ortho Exam   he is alert and oriented x 3.  The knee shows no effusion is cool to touch.  He has 0 to 125 degrees of motion with mild to minimal crepitance.  Quad hamstring function is 4-1/2 out of 5 secondary to knee pain.  No instability in flexion extension nor any varus or valgus instability 10 to 30 degrees.  There is medial joint line tenderness and some tenderness over the MCL.  The calf nontender.  Good distal pulses.    Assessment:      MRI Knee Left Without Contrast  Result Date: 6/13/2025  Impression: 1.Evidence for horizontal type meniscal tear involving the body segment of the medial meniscus extending through the inferior articular surface. 2.Mild tricompartmental osteoarthritis is noted. Full-thickness articular cartilage loss is identified within the medial  compartment. Electronically Signed: Madan Avalos MD  6/13/2025 1:52 PM EDT  Workstation ID: [id number]        1. Primary osteoarthritis of left knee           Plan: Reviewed the MRI images with the patient and his exam.  He is developing mild to moderate  arthritis of the knee.  He continues to have severe pain by the oral steroids and the Mobic.  After viewing treatment options we will proceed today with a cortisone injection to try to get him some relief therefore after sterile prepping through the superolateral portal he underwent injection of 4 cc of lidocaine and 2 cc of Kenalog.  See me in 3 weeks.  I am also going to contact insurance company regarding gel injections in the event that the cortisone injection offers no relief.  When he comes back we will also evaluate the left knee.  We already have x-rays of that left knee.  He will be off work until seen back in 3 weeks but if the cortisone injection does relieve the pain and he wants to return  to work he can call to be released before he seen in the office in 3 weeks.  Patient was in agreement.  Answered all questions  - Large Joint Arthrocentesis: L knee on 6/18/2025 8:24 AM  Indications: pain  Details: 22 G needle, superolateral approach  Medications: 4 mL lidocaine PF 1% 1 %; 80 mg triamcinolone acetonide 40 MG/ML  Outcome: tolerated well, no immediate complications  Procedure, treatment alternatives, risks and benefits explained, specific risks discussed. Consent was given by the patient. Immediately prior to procedure a time out was called to verify the correct patient, procedure, equipment, support staff and site/side marked as required. Patient was prepped and draped in the usual sterile fashion.                   Work Status:    ARACELI query complete.    Orders:  Orders Placed This Encounter   Procedures    - Large Joint Arthrocentesis: L knee    Visco Treatment       Medications:  No orders of the defined types were placed in this encounter.      Followup:  Return in about 3 weeks (around 7/9/2025).          Dictated utilizing Dragon dictation

## 2025-07-09 ENCOUNTER — OFFICE VISIT (OUTPATIENT)
Dept: ORTHOPEDIC SURGERY | Facility: CLINIC | Age: 50
End: 2025-07-09
Payer: COMMERCIAL

## 2025-07-09 VITALS — HEIGHT: 69 IN | WEIGHT: 250 LBS | BODY MASS INDEX: 37.03 KG/M2

## 2025-07-09 DIAGNOSIS — M17.12 PRIMARY OSTEOARTHRITIS OF LEFT KNEE: Primary | ICD-10-CM

## 2025-07-09 NOTE — PROGRESS NOTES
Subjective: Osteoarthritis left knee     Patient ID: Balaji Cisneros is a 50 y.o. male.    Chief Complaint:    History of Present Illness patient seen for Durolane injection into the left knee       Social History     Occupational History    Not on file   Tobacco Use    Smoking status: Former     Current packs/day: 0.00     Average packs/day: 1.5 packs/day for 15.0 years (22.5 ttl pk-yrs)     Types: Cigarettes     Quit date: 2011     Years since quittin.4     Passive exposure: Past    Smokeless tobacco: Never   Vaping Use    Vaping status: Never Used   Substance and Sexual Activity    Alcohol use: Yes     Alcohol/week: 1.0 standard drink of alcohol     Types: 1 Cans of beer per week     Comment: rarely    Drug use: Never    Sexual activity: Yes     Partners: Female      Review of Systems      Past Medical History:   Diagnosis Date    Arthritis of neck     Brain tumor (benign)     follows with Neurology    Cervical disc disorder     CTS (carpal tunnel syndrome)     Fatty liver     GERD (gastroesophageal reflux disease)     Headache     Hx of blood clots     in back - no blood thinner approx 8 years ago    Hyperlipidemia     Hypertension     Knee swelling     Myocardial infarction     Rotator cuff syndrome      Past Surgical History:   Procedure Laterality Date    CARDIAC CATHETERIZATION      CHOLECYSTECTOMY WITH INTRAOPERATIVE CHOLANGIOGRAM N/A 2025    Procedure: CHOLECYSTECTOMY LAPAROSCOPIC INTRAOPERATIVE CHOLANGIOGRAM;  Surgeon: Magda Montelongo DO;  Location: [facility];  Service: General;  Laterality: N/A;     Family History   Problem Relation Age of Onset    Diabetes Mother          Objective:  There were no vitals filed for this visit.      25  0851   Weight: 113 kg (250 lb)     Body mass index is 36.92 kg/m².           Ortho Exam   he is alert and oriented x 3.  The left knee shows no swelling effusion erythema is cool to touch.  Therefore through the superolateral portal after sterile  prepping underwent a 6 mL Durolane injection.  Postinjection instructions given.    Assessment:        1. Primary osteoarthritis of left knee           Plan: Return to see me in 3 weeks.  Off work until seen.  Answered all questions    - Large Joint Arthrocentesis: L knee on 7/9/2025 9:03 AM  Indications: pain  Details: 21 G needle, superolateral approach  Medications: 60 mg Sodium Hyaluronate 60 MG/3ML  Outcome: tolerated well, no immediate complications  Procedure, treatment alternatives, risks and benefits explained, specific risks discussed. Consent was given by the patient. Immediately prior to procedure a time out was called to verify the correct patient, procedure, equipment, support staff and site/side marked as required. Patient was prepped and draped in the usual sterile fashion.                    Work Status:    ARACELI query complete.    Orders:  Orders Placed This Encounter   Procedures    - Large Joint Arthrocentesis: L knee       Medications:  No orders of the defined types were placed in this encounter.      Followup:  Return in about 3 weeks (around 7/30/2025).          Dictated utilizing Dragon dictation

## 2025-07-30 ENCOUNTER — OFFICE VISIT (OUTPATIENT)
Dept: ORTHOPEDIC SURGERY | Facility: CLINIC | Age: 50
End: 2025-07-30
Payer: COMMERCIAL

## 2025-07-30 VITALS — WEIGHT: 250 LBS | HEIGHT: 69 IN | BODY MASS INDEX: 37.03 KG/M2

## 2025-07-30 DIAGNOSIS — M25.562 LEFT KNEE PAIN, UNSPECIFIED CHRONICITY: Primary | ICD-10-CM

## 2025-07-30 DIAGNOSIS — M17.12 PRIMARY OSTEOARTHRITIS OF LEFT KNEE: ICD-10-CM

## 2025-07-30 PROCEDURE — 99213 OFFICE O/P EST LOW 20 MIN: CPT | Performed by: ORTHOPAEDIC SURGERY

## 2025-07-30 NOTE — PROGRESS NOTES
Subjective: Osteoarthritis left knee     Patient ID: Balaji Cisneros is a 50 y.o. male.    Chief Complaint:    History of Present Illness patient is seen back after having undergone a Durolane injection on  and he notes almost complete relief of his pain and discomfort he states he is now able to perform all activities of daily living and is ready to return to work.       Social History     Occupational History    Not on file   Tobacco Use    Smoking status: Former     Current packs/day: 0.00     Average packs/day: 1.5 packs/day for 15.0 years (22.5 ttl pk-yrs)     Types: Cigarettes     Quit date: 2011     Years since quittin.5     Passive exposure: Past    Smokeless tobacco: Never   Vaping Use    Vaping status: Never Used   Substance and Sexual Activity    Alcohol use: Yes     Alcohol/week: 1.0 standard drink of alcohol     Types: 1 Cans of beer per week     Comment: rarely    Drug use: Never    Sexual activity: Yes     Partners: Female      Review of Systems      Past Medical History:   Diagnosis Date    Arthritis of neck     Brain tumor (benign)     follows with Neurology    Cervical disc disorder     CTS (carpal tunnel syndrome)     Fatty liver     GERD (gastroesophageal reflux disease)     Headache     Hx of blood clots     in back - no blood thinner approx 8 years ago    Hyperlipidemia     Hypertension     Knee swelling     Myocardial infarction     Rotator cuff syndrome      Past Surgical History:   Procedure Laterality Date    CARDIAC CATHETERIZATION      CHOLECYSTECTOMY WITH INTRAOPERATIVE CHOLANGIOGRAM N/A 2025    Procedure: CHOLECYSTECTOMY LAPAROSCOPIC INTRAOPERATIVE CHOLANGIOGRAM;  Surgeon: Magda Montelongo DO;  Location: [facility];  Service: General;  Laterality: N/A;     Family History   Problem Relation Age of Onset    Diabetes Mother          Objective:  There were no vitals filed for this visit.      25  0854   Weight: 113 kg (250 lb)     Body mass index is 36.92 kg/m².            Ortho Exam   he is alert and oriented x 3.  The knee is no swelling effusion erythema there is no pain with range of motion.  He is doing well is pleased with the result.    Assessment:        1. Left knee pain, unspecified chronicity    2. Primary osteoarthritis of left knee           Plan: Discussed with the patient these injections can be given every 6 months so he can get another injection on January 9 of thereafter.  If the pain develops around November or December he can come in for cortisone injection.  But if the pain should return in the next month or 2 I would try a medial  brace as opposed to recommending surgery.  Again at his request released return to work no restrictions and return to see me as needed.  Answered all questions            Work Status:    ARACELI query complete.    Orders:  No orders of the defined types were placed in this encounter.      Medications:  No orders of the defined types were placed in this encounter.      Followup:  Return if symptoms worsen or fail to improve.          Dictated utilizing Dragon dictation

## (undated) DEVICE — SYR LUERLOK 20CC BX/50

## (undated) DEVICE — TROCARS: Brand: KII® BALLOON BLUNT TIP SYSTEM

## (undated) DEVICE — GLV SURG SENSICARE W/ALOE PF LF 6.5 STRL

## (undated) DEVICE — ENDOPATH XCEL UNIVERSAL TROCAR STABLILITY SLEEVES: Brand: ENDOPATH XCEL

## (undated) DEVICE — DRSNG SURESITE WNDW 4X4.5

## (undated) DEVICE — PENCL SMOKE/EVAC MEGADYNE TELESCP 10FT

## (undated) DEVICE — 2, DISPOSABLE SUCTION/IRRIGATOR WITH DISPOSABLE TIP: Brand: STRYKEFLOW

## (undated) DEVICE — SUT VIC 0 UR6 27IN VCP603H

## (undated) DEVICE — DECANTER BAG 9": Brand: MEDLINE INDUSTRIES, INC.

## (undated) DEVICE — ANTIBACTERIAL UNDYED BRAIDED (POLYGLACTIN 910), SYNTHETIC ABSORBABLE SUTURE: Brand: COATED VICRYL

## (undated) DEVICE — DRAPE,REIN 53X77,STERILE: Brand: MEDLINE

## (undated) DEVICE — SYR LUERLOK 30CC

## (undated) DEVICE — PAD,NON-ADHERENT,3X8,STERILE,LF,1/PK: Brand: MEDLINE

## (undated) DEVICE — METZENBAUM SCISSOR TIP, DISPOSABLE: Brand: RENEW

## (undated) DEVICE — TRAP FLD MINIVAC MEGADYNE 100ML

## (undated) DEVICE — DRP C/ARM 41X74IN

## (undated) DEVICE — PK LAP GEN 90

## (undated) DEVICE — MICRO HVTSA, 0.5G AND HVTSA SOURCEMARK PRODUCT CODE M1206 AND M1206-01: Brand: EXOFIN MICRO HVTSA, 0.5G

## (undated) DEVICE — SOL IRR H2O BO 1000ML STRL

## (undated) DEVICE — TRANSFER SET 3": Brand: MEDLINE INDUSTRIES, INC.

## (undated) DEVICE — ENDOPATH XCEL BLADELESS TROCARS WITH STABILITY SLEEVES: Brand: ENDOPATH XCEL

## (undated) DEVICE — LAPAROSCOPIC SMOKE FILTRATION SYSTEM: Brand: PALL LAPAROSHIELD® PLUS LAPAROSCOPIC SMOKE FILTRATION SYSTEM

## (undated) DEVICE — DRSNG SURESITE WNDW 2.38X2.75

## (undated) DEVICE — ENDOPOUCH RETRIEVER SPECIMEN RETRIEVAL BAGS: Brand: ENDOPOUCH RETRIEVER

## (undated) DEVICE — SET CATH CHOLANG REDK W/SCOOP TIP INTRO 4F 50CM

## (undated) DEVICE — LAPAROSCOPIC SCOPE WARMER: Brand: DEROYAL

## (undated) DEVICE — CONTAINER,SPECIMEN,OR STERILE,4OZ: Brand: MEDLINE